# Patient Record
Sex: MALE | Race: BLACK OR AFRICAN AMERICAN | NOT HISPANIC OR LATINO | Employment: FULL TIME | URBAN - METROPOLITAN AREA
[De-identification: names, ages, dates, MRNs, and addresses within clinical notes are randomized per-mention and may not be internally consistent; named-entity substitution may affect disease eponyms.]

---

## 2017-08-30 ENCOUNTER — OFFICE VISIT (OUTPATIENT)
Dept: UROLOGY | Facility: CLINIC | Age: 27
End: 2017-08-30
Payer: COMMERCIAL

## 2017-08-30 VITALS
HEIGHT: 67 IN | WEIGHT: 185.63 LBS | DIASTOLIC BLOOD PRESSURE: 75 MMHG | HEART RATE: 60 BPM | SYSTOLIC BLOOD PRESSURE: 108 MMHG | BODY MASS INDEX: 29.13 KG/M2

## 2017-08-30 DIAGNOSIS — E29.1 TESTICULAR FAILURE: Primary | ICD-10-CM

## 2017-08-30 PROCEDURE — 99999 PR PBB SHADOW E&M-NEW PATIENT-LVL III: CPT | Mod: PBBFAC,,, | Performed by: UROLOGY

## 2017-08-30 PROCEDURE — 99204 OFFICE O/P NEW MOD 45 MIN: CPT | Mod: S$GLB,,, | Performed by: UROLOGY

## 2017-08-30 PROCEDURE — 3008F BODY MASS INDEX DOCD: CPT | Mod: S$GLB,,, | Performed by: UROLOGY

## 2017-08-30 NOTE — LETTER
August 30, 2017        Maxine Greer MD  [address]  [facility] [address]             [facility] - Urology 4th Floor  [address]  [place] [address]  Phone: [phone]   Patient: Elder Ramos   MR Number: [id number]   YOB: 1990   Date of Visit: 8/30/2017       Dear Dr. Greer:    Thank you for referring Elder Ramos to me for evaluation. Attached you will find relevant portions of my assessment and plan of care.    If you have questions, please do not hesitate to call me. I look forward to following Elder Ramos along with you.    Sincerely,      Marc Obrien MD            CC  No Recipients    Enclosure

## 2017-08-30 NOTE — PROGRESS NOTES
"Chief Complaint:  Infertility    HPI:    Mr. Ramos is a 27 y.o.  male who has been with his girlfriend for the past 4 years. They have been trying to achieve a pregnancy for the past 3 years but without success. Elder Ramos has not undergone a semen analysis. He denies a history of erectile dysfunction and ejaculatory problems.    He has achieved 0 pregnancies in the past.    His girlfriend  is 28 years old. Her menses are regular. She has not undergone prior hysterosalpingogram. She has achieved 0 prior pregnancies.  She sees Dr. Greer.    The couple has not undergone prior intrauterine insemination procedures.    The couple has not undergone prior in-vitro fertilization procedures.    Elder Ramos denies a history of exposure to harmful chemicals, toxins, and radiation.    No history of recent fevers greater than 101.5 degrees Farenheit.    No history of recent exposure to "wet heat."    No history of urological trauma or testicular torsion.    No history of prostatitis, epididymitis, and orchitis.    No history of post-pubertal mumps.    There is no known family history of fertility problems.    REVIEW OF SYSTEMS:     He denies any history of headache, blurred vision, fever, nausea, vomiting, chills, flank discomfort, abdominal pain, bleeding per rectum, cough, SOB, recent loss of consciousness, recent mental status changes, seizures, dizziness, or upper/lower extremity weakness.    PHYSICAL EXAM:     The patient generally appears in good health, is appropriately interactive, and is in no apparent distress.     Eyes: anicteric sclerae, moist conjunctivae; no lid-lag; PERRLA     HENT: Atraumatic; oropharynx clear with moist mucous membranes and no mucosal ulcerations;normal hard and soft palate.  No evidence of lymphadenopathy.    Neck: Trachea midline.  No thyromegaly.    Musculoskeletal: No abnormal gait.    Skin: No lesions.    Mental: Cooperative with normal affect.  Is oriented to time, place, " "and person.    Neuro: Grossly intact.    Chest: Normal inspiratory effort.   No accessory muscles.  No audible wheezes.  Respirations symmetric on inspiration and expiration.    Heart: Regular rhythm.      Abdomen:  Soft, non-tender. No masses or organomegaly. Bladder is not palpable. No evidence of flank discomfort. No evidence of inguinal hernia.    Genitourinary: Penis is normal with no evidence of plaques or induration. Urethral meatus is normal. Scrotum is normal. Testes are descended bilaterally with no evidence of abnormal masses or tenderness. Epididymis, vas deferens, and cord structures are normal bilaterally.  Testicular volume is approximately 18 cc bilaterally.    Extremities: No cyanosis, clubbing, or edema.    IMPRESSION & PLAN:    Mr. Ramos is a 27 y.o.  male who has been with his girlfriend for the past 4 years. They have been trying to achieve a pregnancy for the past 3 years but without success. Elder Ramos has not undergone a semen analysis. He denies a history of erectile dysfunction and ejaculatory problems.    He has achieved 0 pregnancies in the past.    1.  FSH, LH, testosterone, prolactin, and estradiol serum levels today.  2.  Semen analysis x 2.  3.  Return to the clinic in 3 weeks to discuss test results and treatment plan.  4.  Recommend avoiding "wet heat."  5.  Recommend taking a multivitamin and 500 mg of vitamin c daily in addition to the multivitamin.  6.  Please send a copy of the note to Dr. Greer.  Thank you for the consultation.    CC: Zoey    "

## 2021-08-27 ENCOUNTER — CLINICAL SUPPORT (OUTPATIENT)
Dept: URGENT CARE | Facility: CLINIC | Age: 31
End: 2021-08-27
Payer: COMMERCIAL

## 2021-08-27 DIAGNOSIS — Z11.9 ENCOUNTER FOR SCREENING EXAMINATION FOR INFECTIOUS DISEASE: Primary | ICD-10-CM

## 2021-08-27 LAB
CTP QC/QA: YES
SARS-COV-2 RDRP RESP QL NAA+PROBE: NEGATIVE

## 2021-08-27 PROCEDURE — 99211 OFF/OP EST MAY X REQ PHY/QHP: CPT | Mod: S$GLB,CS,, | Performed by: FAMILY MEDICINE

## 2021-08-27 PROCEDURE — U0002: ICD-10-PCS | Mod: QW,S$GLB,, | Performed by: FAMILY MEDICINE

## 2021-08-27 PROCEDURE — U0002 COVID-19 LAB TEST NON-CDC: HCPCS | Mod: QW,S$GLB,, | Performed by: FAMILY MEDICINE

## 2021-08-27 PROCEDURE — 99211 PR OFFICE/OUTPT VISIT, EST, LEVL I: ICD-10-PCS | Mod: S$GLB,CS,, | Performed by: FAMILY MEDICINE

## 2024-04-22 ENCOUNTER — TELEPHONE (OUTPATIENT)
Dept: UROLOGY | Facility: CLINIC | Age: 34
End: 2024-04-22

## 2024-04-22 NOTE — TELEPHONE ENCOUNTER
Call was placed to patient no answer left VM. Patient doesn't have portal set up will attempt to call patient tomorrow.

## 2024-05-27 ENCOUNTER — TELEPHONE (OUTPATIENT)
Dept: UROLOGY | Facility: CLINIC | Age: 34
End: 2024-05-27

## 2024-09-06 ENCOUNTER — HOSPITAL ENCOUNTER (INPATIENT)
Facility: OTHER | Age: 34
LOS: 1 days | Discharge: HOME | DRG: 642 | End: 2024-09-07
Attending: EMERGENCY MEDICINE | Admitting: HOSPITALIST
Payer: MEDICAID

## 2024-09-06 DIAGNOSIS — F12.288 CANNABIS HYPEREMESIS SYNDROME CONCURRENT WITH AND DUE TO CANNABIS DEPENDENCE: ICD-10-CM

## 2024-09-06 DIAGNOSIS — R11.2 NAUSEA AND VOMITING, UNSPECIFIED VOMITING TYPE: ICD-10-CM

## 2024-09-06 DIAGNOSIS — R07.9 CHEST PAIN: ICD-10-CM

## 2024-09-06 DIAGNOSIS — R11.2 CANNABINOID HYPEREMESIS SYNDROME: ICD-10-CM

## 2024-09-06 DIAGNOSIS — F12.90 CANNABINOID HYPEREMESIS SYNDROME: ICD-10-CM

## 2024-09-06 DIAGNOSIS — E83.39 HYPOPHOSPHATEMIA: ICD-10-CM

## 2024-09-06 DIAGNOSIS — R11.2 INTRACTABLE NAUSEA AND VOMITING: Primary | ICD-10-CM

## 2024-09-06 DIAGNOSIS — E86.0 DEHYDRATION: ICD-10-CM

## 2024-09-06 LAB
ALBUMIN SERPL BCP-MCNC: 4.2 G/DL (ref 3.5–5.2)
ALP SERPL-CCNC: 46 U/L (ref 55–135)
ALT SERPL W/O P-5'-P-CCNC: 17 U/L (ref 10–44)
ANION GAP SERPL CALC-SCNC: 10 MMOL/L (ref 8–16)
ANION GAP SERPL CALC-SCNC: 14 MMOL/L (ref 8–16)
AST SERPL-CCNC: 22 U/L (ref 10–40)
BASOPHILS # BLD AUTO: 0.01 K/UL (ref 0–0.2)
BASOPHILS NFR BLD: 0.2 % (ref 0–1.9)
BILIRUB SERPL-MCNC: 1.3 MG/DL (ref 0.1–1)
BILIRUB UR QL STRIP: NEGATIVE
BUN SERPL-MCNC: 9 MG/DL (ref 6–20)
BUN SERPL-MCNC: 9 MG/DL (ref 6–20)
CALCIUM SERPL-MCNC: 8.7 MG/DL (ref 8.7–10.5)
CALCIUM SERPL-MCNC: 8.9 MG/DL (ref 8.7–10.5)
CHLORIDE SERPL-SCNC: 106 MMOL/L (ref 95–110)
CHLORIDE SERPL-SCNC: 109 MMOL/L (ref 95–110)
CLARITY UR: CLEAR
CO2 SERPL-SCNC: 18 MMOL/L (ref 23–29)
CO2 SERPL-SCNC: 20 MMOL/L (ref 23–29)
COLOR UR: YELLOW
CREAT SERPL-MCNC: 1 MG/DL (ref 0.5–1.4)
CREAT SERPL-MCNC: 1.1 MG/DL (ref 0.5–1.4)
DIFFERENTIAL METHOD BLD: ABNORMAL
EOSINOPHIL # BLD AUTO: 0 K/UL (ref 0–0.5)
EOSINOPHIL NFR BLD: 0 % (ref 0–8)
ERYTHROCYTE [DISTWIDTH] IN BLOOD BY AUTOMATED COUNT: 11 % (ref 11.5–14.5)
EST. GFR  (NO RACE VARIABLE): >60 ML/MIN/1.73 M^2
EST. GFR  (NO RACE VARIABLE): >60 ML/MIN/1.73 M^2
GLUCOSE SERPL-MCNC: 105 MG/DL (ref 70–110)
GLUCOSE SERPL-MCNC: 99 MG/DL (ref 70–110)
GLUCOSE UR QL STRIP: NEGATIVE
HCT VFR BLD AUTO: 42.2 % (ref 40–54)
HCV AB SERPL QL IA: NEGATIVE
HGB BLD-MCNC: 14.5 G/DL (ref 14–18)
HGB UR QL STRIP: NEGATIVE
HIV 1+2 AB+HIV1 P24 AG SERPL QL IA: NEGATIVE
IMM GRANULOCYTES # BLD AUTO: 0.01 K/UL (ref 0–0.04)
IMM GRANULOCYTES NFR BLD AUTO: 0.2 % (ref 0–0.5)
KETONES UR QL STRIP: ABNORMAL
LEUKOCYTE ESTERASE UR QL STRIP: NEGATIVE
LIPASE SERPL-CCNC: 10 U/L (ref 4–60)
LYMPHOCYTES # BLD AUTO: 1 K/UL (ref 1–4.8)
LYMPHOCYTES NFR BLD: 20.2 % (ref 18–48)
MAGNESIUM SERPL-MCNC: 2.2 MG/DL (ref 1.6–2.6)
MAGNESIUM SERPL-MCNC: 2.6 MG/DL (ref 1.6–2.6)
MCH RBC QN AUTO: 34.3 PG (ref 27–31)
MCHC RBC AUTO-ENTMCNC: 34.4 G/DL (ref 32–36)
MCV RBC AUTO: 100 FL (ref 82–98)
MONOCYTES # BLD AUTO: 0.5 K/UL (ref 0.3–1)
MONOCYTES NFR BLD: 9.8 % (ref 4–15)
NEUTROPHILS # BLD AUTO: 3.6 K/UL (ref 1.8–7.7)
NEUTROPHILS NFR BLD: 69.6 % (ref 38–73)
NITRITE UR QL STRIP: NEGATIVE
NRBC BLD-RTO: 0 /100 WBC
PH UR STRIP: 7 [PH] (ref 5–8)
PHOSPHATE SERPL-MCNC: 3.2 MG/DL (ref 2.7–4.5)
PHOSPHATE SERPL-MCNC: <1 MG/DL (ref 2.7–4.5)
PLATELET # BLD AUTO: 216 K/UL (ref 150–450)
PMV BLD AUTO: 9.1 FL (ref 9.2–12.9)
POCT GLUCOSE: 100 MG/DL (ref 70–110)
POCT GLUCOSE: 101 MG/DL (ref 70–110)
POTASSIUM SERPL-SCNC: 3.5 MMOL/L (ref 3.5–5.1)
POTASSIUM SERPL-SCNC: 4.4 MMOL/L (ref 3.5–5.1)
PROT SERPL-MCNC: 7.7 G/DL (ref 6–8.4)
PROT UR QL STRIP: NEGATIVE
RBC # BLD AUTO: 4.23 M/UL (ref 4.6–6.2)
SODIUM SERPL-SCNC: 138 MMOL/L (ref 136–145)
SODIUM SERPL-SCNC: 139 MMOL/L (ref 136–145)
SP GR UR STRIP: 1.02 (ref 1–1.03)
URN SPEC COLLECT METH UR: ABNORMAL
UROBILINOGEN UR STRIP-ACNC: ABNORMAL EU/DL
WBC # BLD AUTO: 5.1 K/UL (ref 3.9–12.7)

## 2024-09-06 PROCEDURE — 99285 EMERGENCY DEPT VISIT HI MDM: CPT | Mod: 25

## 2024-09-06 PROCEDURE — 63600175 PHARM REV CODE 636 W HCPCS: Performed by: EMERGENCY MEDICINE

## 2024-09-06 PROCEDURE — 83735 ASSAY OF MAGNESIUM: CPT | Performed by: EMERGENCY MEDICINE

## 2024-09-06 PROCEDURE — 86803 HEPATITIS C AB TEST: CPT | Performed by: EMERGENCY MEDICINE

## 2024-09-06 PROCEDURE — 96374 THER/PROPH/DIAG INJ IV PUSH: CPT

## 2024-09-06 PROCEDURE — 80053 COMPREHEN METABOLIC PANEL: CPT | Performed by: EMERGENCY MEDICINE

## 2024-09-06 PROCEDURE — 36415 COLL VENOUS BLD VENIPUNCTURE: CPT | Performed by: HOSPITALIST

## 2024-09-06 PROCEDURE — 25000003 PHARM REV CODE 250: Performed by: EMERGENCY MEDICINE

## 2024-09-06 PROCEDURE — 96361 HYDRATE IV INFUSION ADD-ON: CPT

## 2024-09-06 PROCEDURE — 11000001 HC ACUTE MED/SURG PRIVATE ROOM

## 2024-09-06 PROCEDURE — 63600175 PHARM REV CODE 636 W HCPCS: Performed by: HOSPITALIST

## 2024-09-06 PROCEDURE — 83690 ASSAY OF LIPASE: CPT | Performed by: EMERGENCY MEDICINE

## 2024-09-06 PROCEDURE — 96375 TX/PRO/DX INJ NEW DRUG ADDON: CPT

## 2024-09-06 PROCEDURE — 25000003 PHARM REV CODE 250: Performed by: HOSPITALIST

## 2024-09-06 PROCEDURE — 81003 URINALYSIS AUTO W/O SCOPE: CPT | Performed by: HOSPITALIST

## 2024-09-06 PROCEDURE — 84100 ASSAY OF PHOSPHORUS: CPT | Performed by: EMERGENCY MEDICINE

## 2024-09-06 PROCEDURE — 80048 BASIC METABOLIC PNL TOTAL CA: CPT | Mod: XB | Performed by: HOSPITALIST

## 2024-09-06 PROCEDURE — 85025 COMPLETE CBC W/AUTO DIFF WBC: CPT | Performed by: EMERGENCY MEDICINE

## 2024-09-06 PROCEDURE — 84100 ASSAY OF PHOSPHORUS: CPT | Mod: 91 | Performed by: HOSPITALIST

## 2024-09-06 PROCEDURE — 87389 HIV-1 AG W/HIV-1&-2 AB AG IA: CPT | Performed by: EMERGENCY MEDICINE

## 2024-09-06 PROCEDURE — 96365 THER/PROPH/DIAG IV INF INIT: CPT

## 2024-09-06 PROCEDURE — 83735 ASSAY OF MAGNESIUM: CPT | Mod: 91 | Performed by: HOSPITALIST

## 2024-09-06 RX ORDER — INSULIN ASPART 100 [IU]/ML
0-5 INJECTION, SOLUTION INTRAVENOUS; SUBCUTANEOUS
Status: DISCONTINUED | OUTPATIENT
Start: 2024-09-06 | End: 2024-09-07 | Stop reason: HOSPADM

## 2024-09-06 RX ORDER — ACETAMINOPHEN 325 MG/1
650 TABLET ORAL EVERY 4 HOURS PRN
Status: DISCONTINUED | OUTPATIENT
Start: 2024-09-06 | End: 2024-09-07 | Stop reason: HOSPADM

## 2024-09-06 RX ORDER — IBUPROFEN 200 MG
24 TABLET ORAL
Status: DISCONTINUED | OUTPATIENT
Start: 2024-09-06 | End: 2024-09-07 | Stop reason: HOSPADM

## 2024-09-06 RX ORDER — FAMOTIDINE 10 MG/ML
20 INJECTION INTRAVENOUS
Status: COMPLETED | OUTPATIENT
Start: 2024-09-06 | End: 2024-09-06

## 2024-09-06 RX ORDER — SODIUM CHLORIDE, SODIUM LACTATE, POTASSIUM CHLORIDE, CALCIUM CHLORIDE 600; 310; 30; 20 MG/100ML; MG/100ML; MG/100ML; MG/100ML
INJECTION, SOLUTION INTRAVENOUS CONTINUOUS
Status: DISCONTINUED | OUTPATIENT
Start: 2024-09-06 | End: 2024-09-07 | Stop reason: HOSPADM

## 2024-09-06 RX ORDER — TALC
6 POWDER (GRAM) TOPICAL NIGHTLY PRN
Status: DISCONTINUED | OUTPATIENT
Start: 2024-09-06 | End: 2024-09-07 | Stop reason: HOSPADM

## 2024-09-06 RX ORDER — IBUPROFEN 200 MG
16 TABLET ORAL
Status: DISCONTINUED | OUTPATIENT
Start: 2024-09-06 | End: 2024-09-07 | Stop reason: HOSPADM

## 2024-09-06 RX ORDER — PROCHLORPERAZINE EDISYLATE 5 MG/ML
5 INJECTION INTRAMUSCULAR; INTRAVENOUS EVERY 6 HOURS PRN
Status: DISCONTINUED | OUTPATIENT
Start: 2024-09-06 | End: 2024-09-07 | Stop reason: HOSPADM

## 2024-09-06 RX ORDER — MORPHINE SULFATE 2 MG/ML
2 INJECTION, SOLUTION INTRAMUSCULAR; INTRAVENOUS EVERY 6 HOURS PRN
Status: DISCONTINUED | OUTPATIENT
Start: 2024-09-06 | End: 2024-09-07 | Stop reason: HOSPADM

## 2024-09-06 RX ORDER — DROPERIDOL 2.5 MG/ML
1.25 INJECTION, SOLUTION INTRAMUSCULAR; INTRAVENOUS EVERY 6 HOURS
Status: DISCONTINUED | OUTPATIENT
Start: 2024-09-06 | End: 2024-09-07 | Stop reason: HOSPADM

## 2024-09-06 RX ORDER — GLUCAGON 1 MG
1 KIT INJECTION
Status: DISCONTINUED | OUTPATIENT
Start: 2024-09-06 | End: 2024-09-07 | Stop reason: HOSPADM

## 2024-09-06 RX ORDER — NALOXONE HCL 0.4 MG/ML
0.02 VIAL (ML) INJECTION
Status: DISCONTINUED | OUTPATIENT
Start: 2024-09-06 | End: 2024-09-07 | Stop reason: HOSPADM

## 2024-09-06 RX ORDER — ONDANSETRON HYDROCHLORIDE 2 MG/ML
4 INJECTION, SOLUTION INTRAVENOUS EVERY 8 HOURS PRN
Status: DISCONTINUED | OUTPATIENT
Start: 2024-09-06 | End: 2024-09-07 | Stop reason: HOSPADM

## 2024-09-06 RX ORDER — PANTOPRAZOLE SODIUM 40 MG/10ML
40 INJECTION, POWDER, LYOPHILIZED, FOR SOLUTION INTRAVENOUS 2 TIMES DAILY
Status: DISCONTINUED | OUTPATIENT
Start: 2024-09-06 | End: 2024-09-07 | Stop reason: HOSPADM

## 2024-09-06 RX ORDER — DROPERIDOL 2.5 MG/ML
1.25 INJECTION, SOLUTION INTRAMUSCULAR; INTRAVENOUS
Status: COMPLETED | OUTPATIENT
Start: 2024-09-06 | End: 2024-09-06

## 2024-09-06 RX ADMIN — PANTOPRAZOLE SODIUM 40 MG: 40 INJECTION, POWDER, LYOPHILIZED, FOR SOLUTION INTRAVENOUS at 08:09

## 2024-09-06 RX ADMIN — POTASSIUM PHOSPHATE, MONOBASIC POTASSIUM PHOSPHATE, DIBASIC 30 MMOL: 224; 236 INJECTION, SOLUTION, CONCENTRATE INTRAVENOUS at 02:09

## 2024-09-06 RX ADMIN — DROPERIDOL 1.25 MG: 2.5 INJECTION, SOLUTION INTRAMUSCULAR; INTRAVENOUS at 06:09

## 2024-09-06 RX ADMIN — SODIUM CHLORIDE 1000 ML: 0.9 INJECTION, SOLUTION INTRAVENOUS at 12:09

## 2024-09-06 RX ADMIN — PROCHLORPERAZINE EDISYLATE 5 MG: 5 INJECTION INTRAMUSCULAR; INTRAVENOUS at 02:09

## 2024-09-06 RX ADMIN — DROPERIDOL 1.25 MG: 2.5 INJECTION, SOLUTION INTRAMUSCULAR; INTRAVENOUS at 12:09

## 2024-09-06 RX ADMIN — SODIUM CHLORIDE, POTASSIUM CHLORIDE, SODIUM LACTATE AND CALCIUM CHLORIDE: 600; 310; 30; 20 INJECTION, SOLUTION INTRAVENOUS at 11:09

## 2024-09-06 RX ADMIN — PANTOPRAZOLE SODIUM 40 MG: 40 INJECTION, POWDER, LYOPHILIZED, FOR SOLUTION INTRAVENOUS at 04:09

## 2024-09-06 RX ADMIN — MORPHINE SULFATE 2 MG: 2 INJECTION, SOLUTION INTRAMUSCULAR; INTRAVENOUS at 04:09

## 2024-09-06 RX ADMIN — ONDANSETRON 4 MG: 2 INJECTION INTRAMUSCULAR; INTRAVENOUS at 05:09

## 2024-09-06 RX ADMIN — DROPERIDOL 1.25 MG: 2.5 INJECTION, SOLUTION INTRAMUSCULAR; INTRAVENOUS at 11:09

## 2024-09-06 RX ADMIN — SODIUM CHLORIDE, POTASSIUM CHLORIDE, SODIUM LACTATE AND CALCIUM CHLORIDE: 600; 310; 30; 20 INJECTION, SOLUTION INTRAVENOUS at 03:09

## 2024-09-06 RX ADMIN — FAMOTIDINE 20 MG: 10 INJECTION, SOLUTION INTRAVENOUS at 12:09

## 2024-09-06 NOTE — ED TRIAGE NOTES
Pt reports N/V/ for the past several days. He was recently seen for the same thing. Pt states he last smoked marijuana last week.

## 2024-09-06 NOTE — H&P
[facility] Medicine  History & Physical    Patient Name: Elder Ramos  MRN: [id number]  Patient Class: IP- Inpatient  Admission Date: 9/6/2024  Attending Physician: Nazia Pelletier MD   Primary Care Provider: Jessica Primary Doctor         Patient information was obtained from patient and ER records.     Subjective:     Principal Problem:Intractable nausea and vomiting    Chief Complaint:   Chief Complaint   Patient presents with    Emesis     Reports N/V and upper abd pain onset Friday. Seen at [facility] for same complaint. Hx of cannabinoid hyperemesis. Vomiting on arrival to ED.         HPI: 35 y/o male with no significant past medical history presented with intractable nausea and vomiting x1 week.  Patient does smoke marijuana daily for many years but has not smoked since starting with the nausea and vomiting.  He has been to 2 separate ER visits, 1st on 09/02 and then again yesterday prior to presenting to this facility where he was diagnosed with cannabis hyperemesis syndrome and instructed to follow up with GI on the last ER visit.  He reports he saw Dr. Sanchez in clinic, and was prescribed a medication for nausea and something for her stomach this morning.  Patient reports he feels lightheaded, weak, and constant nausea.  He did reports some relief after getting fluid bolus, but now does not feel well again.  He was abdominal pain/discomfort in the midepigastric area, with no radiation of the pain.  No diarrhea.  No fever or chills. Last BM about 6 days ago. In the ER, workup remarkable for <1 phosphorous. Lipase normal, and transaminases normal.    History reviewed. No pertinent past medical history.    History reviewed. No pertinent surgical history.    Review of patient's allergies indicates:  No Known Allergies    No current facility-administered medications on file prior to encounter.     No current outpatient medications on file prior to encounter.     Family History       Problem  Relation (Age of Onset)    Diabetes Mother          Tobacco Use    Smoking status: Not on file    Smokeless tobacco: Not on file   Substance and Sexual Activity    Alcohol use: Yes     Comment: Social    Drug use: Yes     Types: Marijuana     Comment: Smokes daily for many years    Sexual activity: Yes     Review of Systems   Constitutional:  Positive for activity change, appetite change and fatigue. Negative for chills and fever.   HENT:  Negative for trouble swallowing.    Eyes:  Negative for visual disturbance.   Respiratory:  Negative for shortness of breath.    Cardiovascular:  Negative for chest pain and leg swelling.   Gastrointestinal:  Positive for abdominal pain, nausea and vomiting. Negative for abdominal distention, blood in stool and diarrhea.   Endocrine: Negative for polyphagia.   Genitourinary:  Negative for hematuria.   Musculoskeletal:  Negative for back pain.   Skin:  Negative for rash.   Neurological:  Positive for weakness and light-headedness.   Hematological:  Does not bruise/bleed easily.   Psychiatric/Behavioral:  Negative for confusion.      Objective:     Vital Signs (Most Recent):  Temp: 98.1 °F (36.7 °C) (09/06/24 1044)  Pulse: 73 (09/06/24 1402)  Resp: 20 (09/06/24 1044)  BP: (!) 144/85 (09/06/24 1402)  SpO2: 99 % (09/06/24 1402) Vital Signs (24h Range):  Temp:  [98.1 °F (36.7 °C)] 98.1 °F (36.7 °C)  Pulse:  [65-80] 73  Resp:  [20] 20  SpO2:  [99 %-100 %] 99 %  BP: (137-154)/(74-85) 144/85     Weight: 81.6 kg (180 lb)  Body mass index is 27.37 kg/m².     Physical Exam  Vitals reviewed.   Constitutional:       General: He is not in acute distress.     Appearance: He is ill-appearing.   HENT:      Head: Normocephalic and atraumatic.      Nose: Nose normal.      Mouth/Throat:      Mouth: Mucous membranes are dry.   Eyes:      Extraocular Movements: Extraocular movements intact.      Conjunctiva/sclera: Conjunctivae normal.   Cardiovascular:      Rate and Rhythm: Normal rate and regular  rhythm.   Pulmonary:      Effort: Pulmonary effort is normal.      Breath sounds: Normal breath sounds.   Abdominal:      General: Bowel sounds are normal. There is no distension.      Palpations: Abdomen is soft.      Tenderness: There is abdominal tenderness. There is no guarding or rebound.   Musculoskeletal:         General: Normal range of motion.      Cervical back: Normal range of motion and neck supple.      Right lower leg: No edema.      Left lower leg: No edema.   Skin:     General: Skin is warm and dry.      Capillary Refill: Capillary refill takes less than 2 seconds.   Neurological:      Mental Status: He is alert and oriented to person, place, and time.   Psychiatric:         Mood and Affect: Mood normal.         Behavior: Behavior normal.         Thought Content: Thought content normal.                Significant Labs: All pertinent labs within the past 24 hours have been reviewed.    Significant Imaging: I have reviewed all pertinent imaging results/findings within the past 24 hours.  Assessment/Plan:     * Intractable nausea and vomiting  Hypophosphatemia  Dehydration  Possible cannabis hyperemesis syndrome with current cannabis dependence  - Patient with long history of mariajuana use and no previous episodes in the past of cannabis hyperemesis syndrome. Definitive volume depletion/dehydration that is likely contributing to his symptoms. No other obvious other sources at this time. Will do further workup with UA with reflex to culture and check KUB given no recent imaging and no reported bowel movement in 5-6 days  - Correct volume status with IV fluids with LR  - Supplement with IV phosphorous and repeat laboratory testing   - Start IV PPI Q12   - Will have schedule droperidol IV Q6 and have anti-emetics PRN   - Pain control and supportive care  - Trend with labs and correct electrolytes as needed      VTE Risk Mitigation (From admission, onward)           Ordered     IP VTE LOW RISK PATIENT   Once         09/06/24 1357     Place sequential compression device  Until discontinued         09/06/24 1357                         Nazia Pelletier MD  Department of Hospital Medicine  [facility] ([place])

## 2024-09-06 NOTE — Clinical Note
Diagnosis: Hypophosphatemia [043127]   Reason for IP Medical Treatment  (Clinical interventions that can only be accomplished in the IP setting? ) :: IV med   Plans for Post-Acute care--if anticipated (pick the single best option):: A. No post acute care anticipated at this time

## 2024-09-06 NOTE — SUBJECTIVE & OBJECTIVE
History reviewed. No pertinent past medical history.    History reviewed. No pertinent surgical history.    Review of patient's allergies indicates:  No Known Allergies    No current facility-administered medications on file prior to encounter.     No current outpatient medications on file prior to encounter.     Family History       Problem Relation (Age of Onset)    Diabetes Mother          Tobacco Use    Smoking status: Not on file    Smokeless tobacco: Not on file   Substance and Sexual Activity    Alcohol use: Yes     Comment: Social    Drug use: Yes     Types: Marijuana     Comment: Smokes daily for many years    Sexual activity: Yes     Review of Systems   Constitutional:  Positive for activity change, appetite change and fatigue. Negative for chills and fever.   HENT:  Negative for trouble swallowing.    Eyes:  Negative for visual disturbance.   Respiratory:  Negative for shortness of breath.    Cardiovascular:  Negative for chest pain and leg swelling.   Gastrointestinal:  Positive for abdominal pain, nausea and vomiting. Negative for abdominal distention, blood in stool and diarrhea.   Endocrine: Negative for polyphagia.   Genitourinary:  Negative for hematuria.   Musculoskeletal:  Negative for back pain.   Skin:  Negative for rash.   Neurological:  Positive for weakness and light-headedness.   Hematological:  Does not bruise/bleed easily.   Psychiatric/Behavioral:  Negative for confusion.      Objective:     Vital Signs (Most Recent):  Temp: 98.1 °F (36.7 °C) (09/06/24 1044)  Pulse: 73 (09/06/24 1402)  Resp: 20 (09/06/24 1044)  BP: (!) 144/85 (09/06/24 1402)  SpO2: 99 % (09/06/24 1402) Vital Signs (24h Range):  Temp:  [98.1 °F (36.7 °C)] 98.1 °F (36.7 °C)  Pulse:  [65-80] 73  Resp:  [20] 20  SpO2:  [99 %-100 %] 99 %  BP: (137-154)/(74-85) 144/85     Weight: 81.6 kg (180 lb)  Body mass index is 27.37 kg/m².     Physical Exam  Vitals reviewed.   Constitutional:       General: He is not in acute distress.      Appearance: He is ill-appearing.   HENT:      Head: Normocephalic and atraumatic.      Nose: Nose normal.      Mouth/Throat:      Mouth: Mucous membranes are dry.   Eyes:      Extraocular Movements: Extraocular movements intact.      Conjunctiva/sclera: Conjunctivae normal.   Cardiovascular:      Rate and Rhythm: Normal rate and regular rhythm.   Pulmonary:      Effort: Pulmonary effort is normal.      Breath sounds: Normal breath sounds.   Abdominal:      General: Bowel sounds are normal. There is no distension.      Palpations: Abdomen is soft.      Tenderness: There is abdominal tenderness. There is no guarding or rebound.   Musculoskeletal:         General: Normal range of motion.      Cervical back: Normal range of motion and neck supple.      Right lower leg: No edema.      Left lower leg: No edema.   Skin:     General: Skin is warm and dry.      Capillary Refill: Capillary refill takes less than 2 seconds.   Neurological:      Mental Status: He is alert and oriented to person, place, and time.   Psychiatric:         Mood and Affect: Mood normal.         Behavior: Behavior normal.         Thought Content: Thought content normal.                Significant Labs: All pertinent labs within the past 24 hours have been reviewed.    Significant Imaging: I have reviewed all pertinent imaging results/findings within the past 24 hours.

## 2024-09-06 NOTE — NURSING
Pt received form ED, to the floor via transport,no report received on arrival.Pt appeared to be distress free and denies any pain and nausea at the moment.Vitals and BG monitored.IV fluids administered as per order.Admission assessment completed.no any skin skin issues noted.Pt is ambulatory and has no complaints at the moment. Nurses Note -- 4 Eyes      9/6/2024   4:14 PM      Skin assessed during: Admit      [x] No Altered Skin Integrity Present    []Prevention Measures Documented      [] Yes- Altered Skin Integrity Present or Discovered   [] LDA Added if Not in Epic (Describe Wound)   [] New Altered Skin Integrity was Present on Admit and Documented in LDA   [] Wound Image Taken    Wound Care Consulted? No    Attending Nurse:  MALIKA Longoria    Second RN/Staff Member:  MALIKA King

## 2024-09-06 NOTE — ED PROVIDER NOTES
Chief complaint:  Emesis (Reports N/V and upper abd pain onset Friday. Seen at [facility] for same complaint. Hx of cannabinoid hyperemesis. Vomiting on arrival to ED. )      Source of information:  Patient, spouse, old chart    HPI:  Elder Ramos is a 34 y.o. male presenting with recurrence of nausea vomiting, upper abdominal cramping.  Complains of feeling fatigued.  Symptoms have been present for the past 4 or 5 days.  This is his 3rd emergency room visit for this.  Patient reports that he was not prescribed any nausea medicine at the prior visits.  He did see Dr. Teague at [facility] earlier today, I do not have access to those notes but the patient's wife states he instructed to them noted, over to the emergency department and he is also written them a prescription for Zofran and what sounds like omeprazole or similar PPI.  The patient has not had any prior abdominal surgery.  He does not smoke tobacco.  He does drink and was a daily smoker marijuana although stopped this 1 week ago.  It has been discussed with him at previous visits that there is concern this is cannabinoid hyperemesis syndrome.  He does report hot showers help.  He was prescribed capsaicin patches but did not start these yet.  No fevers.  No diarrhea, blood or melena per rectum.  No other acute complaints    ROS: As per HPI    Review of patient's allergies indicates:  No Known Allergies    No current facility-administered medications on file prior to encounter.     No current outpatient medications on file prior to encounter.       PMH:  As per HPI and below:  History reviewed. No pertinent past medical history.  History reviewed. No pertinent surgical history.      Physical Exam:    Vitals:    09/06/24 1221   BP: (!) 154/84   Pulse: 65   Resp:    Temp:        General:  Uncomfortable appearing, mild distress Well developed. Well nourished.  Eyes: PERRL. EOM intact. no photophobia, no nystagmus  Conjunctivae - no pallor or icterus.   ENT:  HEAD: Normal - atraumatic. Normal external ears. Normal nose.  No facial asymmetry. Mucous membranes - dry  Neck: Neck supple. no meningismus.   No JVD.  Cardiovascular: Regular rate and rhythm. Normal S1 and S2. No murmur. No gallop. No rub.  2+ peripheral pulses  Respiratory: Normal breath sounds. No rales. No rhonchi. No wheezes. No tachypnea with exertion.  GI: Soft.  Epigastric tenderness Nondistended. No guarding. No rebound. Normal BS.  Musculoskeletal:  Normal weight-bearing and gait No deformities. Normal ROM x4.   Integument: No acute skin rashes. No clubbing or cyanosis  Neurologic: No gross neurological deficits.    Psychiatric: Awake, alert.  Oriented x3.  Normal speech and mentation.        Labs Reviewed   CBC W/ AUTO DIFFERENTIAL - Abnormal       Result Value    WBC 5.10      RBC 4.23 (*)     Hemoglobin 14.5      Hematocrit 42.2       (*)     MCH 34.3 (*)     MCHC 34.4      RDW 11.0 (*)     Platelets 216      MPV 9.1 (*)     Immature Granulocytes 0.2      Gran # (ANC) 3.6      Immature Grans (Abs) 0.01      Lymph # 1.0      Mono # 0.5      Eos # 0.0      Baso # 0.01      nRBC 0      Gran % 69.6      Lymph % 20.2      Mono % 9.8      Eosinophil % 0.0      Basophil % 0.2      Differential Method Automated     COMPREHENSIVE METABOLIC PANEL - Abnormal    Sodium 138      Potassium 3.5      Chloride 106      CO2 18 (*)     Glucose 99      BUN 9      Creatinine 1.1      Calcium 8.9      Total Protein 7.7      Albumin 4.2      Total Bilirubin 1.3 (*)     Alkaline Phosphatase 46 (*)     AST 22      ALT 17      eGFR >60      Anion Gap 14     PHOSPHORUS - Abnormal    Phosphorus <1.0 (*)     Narrative:     PHOS critical result(s) called and verbal readback obtained from                   Raudel Melendez RN. by MCELIZABETH 09/06/2024 13:09   HIV 1 / 2 ANTIBODY    HIV 1/2 Ag/Ab Negative      Narrative:     Release to patient->Immediate   HEPATITIS C ANTIBODY    Hepatitis C Ab Negative      Narrative:     Release to  patient->Immediate   LIPASE    Lipase 10     MAGNESIUM    Magnesium 2.2         Medications   potassium phosphate 30 mmol in D5W 500 mL infusion (has no administration in time range)   sodium chloride 0.9% bolus 1,000 mL 1,000 mL (1,000 mLs Intravenous New Bag 9/6/24 1220)   droPERidol injection 1.25 mg (1.25 mg Intravenous Given 9/6/24 1220)   famotidine (PF) injection 20 mg (20 mg Intravenous Given 9/6/24 1220)       Medical Decision Making  Differential diagnosis includes gastroenteritis, cannabinoid hyperemesis syndrome, pancreatitis, cholecystitis, hepatitis    Amount and/or Complexity of Data Reviewed  Independent Historian: spouse  External Data Reviewed: labs and notes.  Labs: ordered. Decision-making details documented in ED Course.    Risk  Prescription drug management.  Decision regarding hospitalization.  Diagnosis or treatment significantly limited by social determinants of health.        Critical Care    Date/Time: 9/6/2024 1:30 PM    Performed by: Vinay Royal II, MD  Authorized by: Nazia Pelletier MD  Direct patient critical care time: 20 minutes  Additional history critical care time: 5 minutes  Ordering / reviewing critical care time: 10 minutes  Documentation critical care time: 10 minutes  Consulting other physicians critical care time: 5 minutes  Total critical care time (exclusive of procedural time) : 50 minutes  Critical care was necessary to treat or prevent imminent or life-threatening deterioration of the following conditions: dehydration and metabolic crisis.            MDM:    34 y.o. male with ongoing nausea vomiting which is likely related to marijuana use.  Strongly advised continued cessation.  He did appear dehydrated, IV fluid repletion and Inapsine begun.  His laboratory studies show no leukocytosis.  Normal sodium potassium chloride, however phosphorus was extremely low, less than 1.  After the above interventions he is feeling better, he is now smiling comfortable.  He has  had no further emesis, however given the degree of hypophosphatemia he will need admission for gradual IV repletion, and repeat chemistries to ensure improvement.  Case discussed with the hospitalist service, will admit to Dr Pelletier.  Patient, wife at bedside updated with findings and plan of    Medications   potassium phosphate 30 mmol in D5W 500 mL infusion (has no administration in time range)   sodium chloride 0.9% bolus 1,000 mL 1,000 mL (1,000 mLs Intravenous New Bag 9/6/24 1220)   droPERidol injection 1.25 mg (1.25 mg Intravenous Given 9/6/24 1220)   famotidine (PF) injection 20 mg (20 mg Intravenous Given 9/6/24 1220)       ASSESSMENT:   1. Hypophosphatemia    2. Nausea and vomiting, unspecified vomiting type    3. Dehydration    4. Cannabinoid hyperemesis syndrome             Vinay Royal II, MD  09/06/24 1386

## 2024-09-06 NOTE — HPI
35 y/o male with no significant past medical history presented with intractable nausea and vomiting x1 week.  Patient does smoke marijuana daily for many years but has not smoked since starting with the nausea and vomiting.  He has been to 2 separate ER visits, 1st on 09/02 and then again yesterday prior to presenting to this facility where he was diagnosed with cannabis hyperemesis syndrome and instructed to follow up with GI on the last ER visit.  He reports he saw Dr. Sanchez in clinic, and was prescribed a medication for nausea and something for her stomach this morning.  Patient reports he feels lightheaded, weak, and constant nausea.  He did reports some relief after getting fluid bolus, but now does not feel well again.  He was abdominal pain/discomfort in the midepigastric area, with no radiation of the pain.  No diarrhea.  No fever or chills. Last BM about 6 days ago. In the ER, workup remarkable for <1 phosphorous. Lipase normal, and transaminases normal.

## 2024-09-06 NOTE — ASSESSMENT & PLAN NOTE
Hypophosphatemia  Dehydration  Possible cannabis hyperemesis syndrome with current cannabis dependence  - Patient with long history of mariajuana use and no previous episodes in the past of cannabis hyperemesis syndrome. Definitive volume depletion/dehydration that is likely contributing to his symptoms. No other obvious other sources at this time. Will do further workup with UA with reflex to culture and check KUB given no recent imaging and no reported bowel movement in 5-6 days  - Correct volume status with IV fluids with LR  - Supplement with IV phosphorous and repeat laboratory testing   - Start IV PPI Q12   - Will have schedule droperidol IV Q6 and have anti-emetics PRN   - Pain control and supportive care  - Trend with labs and correct electrolytes as needed

## 2024-09-07 VITALS
DIASTOLIC BLOOD PRESSURE: 80 MMHG | RESPIRATION RATE: 16 BRPM | HEART RATE: 62 BPM | OXYGEN SATURATION: 98 % | WEIGHT: 180 LBS | SYSTOLIC BLOOD PRESSURE: 138 MMHG | BODY MASS INDEX: 27.28 KG/M2 | TEMPERATURE: 98 F | HEIGHT: 68 IN

## 2024-09-07 LAB
ALBUMIN SERPL BCP-MCNC: 3.7 G/DL (ref 3.5–5.2)
ALP SERPL-CCNC: 44 U/L (ref 55–135)
ALT SERPL W/O P-5'-P-CCNC: 15 U/L (ref 10–44)
ANION GAP SERPL CALC-SCNC: 8 MMOL/L (ref 8–16)
AST SERPL-CCNC: 17 U/L (ref 10–40)
BASOPHILS # BLD AUTO: 0.02 K/UL (ref 0–0.2)
BASOPHILS NFR BLD: 0.4 % (ref 0–1.9)
BILIRUB SERPL-MCNC: 1.1 MG/DL (ref 0.1–1)
BUN SERPL-MCNC: 8 MG/DL (ref 6–20)
CALCIUM SERPL-MCNC: 8.8 MG/DL (ref 8.7–10.5)
CHLORIDE SERPL-SCNC: 108 MMOL/L (ref 95–110)
CO2 SERPL-SCNC: 23 MMOL/L (ref 23–29)
CREAT SERPL-MCNC: 1 MG/DL (ref 0.5–1.4)
DIFFERENTIAL METHOD BLD: ABNORMAL
EOSINOPHIL # BLD AUTO: 0 K/UL (ref 0–0.5)
EOSINOPHIL NFR BLD: 0.2 % (ref 0–8)
ERYTHROCYTE [DISTWIDTH] IN BLOOD BY AUTOMATED COUNT: 11 % (ref 11.5–14.5)
EST. GFR  (NO RACE VARIABLE): >60 ML/MIN/1.73 M^2
GLUCOSE SERPL-MCNC: 92 MG/DL (ref 70–110)
HCT VFR BLD AUTO: 38.5 % (ref 40–54)
HGB BLD-MCNC: 12.9 G/DL (ref 14–18)
IMM GRANULOCYTES # BLD AUTO: 0.01 K/UL (ref 0–0.04)
IMM GRANULOCYTES NFR BLD AUTO: 0.2 % (ref 0–0.5)
LYMPHOCYTES # BLD AUTO: 1.4 K/UL (ref 1–4.8)
LYMPHOCYTES NFR BLD: 28 % (ref 18–48)
MAGNESIUM SERPL-MCNC: 2 MG/DL (ref 1.6–2.6)
MCH RBC QN AUTO: 34.5 PG (ref 27–31)
MCHC RBC AUTO-ENTMCNC: 33.5 G/DL (ref 32–36)
MCV RBC AUTO: 103 FL (ref 82–98)
MONOCYTES # BLD AUTO: 0.5 K/UL (ref 0.3–1)
MONOCYTES NFR BLD: 10 % (ref 4–15)
NEUTROPHILS # BLD AUTO: 3.1 K/UL (ref 1.8–7.7)
NEUTROPHILS NFR BLD: 61.2 % (ref 38–73)
NRBC BLD-RTO: 0 /100 WBC
PHOSPHATE SERPL-MCNC: 2.4 MG/DL (ref 2.7–4.5)
PLATELET # BLD AUTO: 184 K/UL (ref 150–450)
PMV BLD AUTO: 9.5 FL (ref 9.2–12.9)
POCT GLUCOSE: 92 MG/DL (ref 70–110)
POCT GLUCOSE: 97 MG/DL (ref 70–110)
POTASSIUM SERPL-SCNC: 3.7 MMOL/L (ref 3.5–5.1)
PROT SERPL-MCNC: 6.5 G/DL (ref 6–8.4)
RBC # BLD AUTO: 3.74 M/UL (ref 4.6–6.2)
SODIUM SERPL-SCNC: 139 MMOL/L (ref 136–145)
WBC # BLD AUTO: 5.08 K/UL (ref 3.9–12.7)

## 2024-09-07 PROCEDURE — 63600175 PHARM REV CODE 636 W HCPCS: Performed by: HOSPITALIST

## 2024-09-07 PROCEDURE — 80053 COMPREHEN METABOLIC PANEL: CPT | Performed by: HOSPITALIST

## 2024-09-07 PROCEDURE — 25000003 PHARM REV CODE 250: Performed by: HOSPITALIST

## 2024-09-07 PROCEDURE — 85025 COMPLETE CBC W/AUTO DIFF WBC: CPT | Performed by: HOSPITALIST

## 2024-09-07 PROCEDURE — 25500020 PHARM REV CODE 255: Performed by: HOSPITALIST

## 2024-09-07 PROCEDURE — 84100 ASSAY OF PHOSPHORUS: CPT | Performed by: HOSPITALIST

## 2024-09-07 PROCEDURE — 36415 COLL VENOUS BLD VENIPUNCTURE: CPT | Performed by: HOSPITALIST

## 2024-09-07 PROCEDURE — 83735 ASSAY OF MAGNESIUM: CPT | Performed by: HOSPITALIST

## 2024-09-07 RX ORDER — PANTOPRAZOLE SODIUM 40 MG/1
40 TABLET, DELAYED RELEASE ORAL DAILY
Status: ON HOLD | COMMUNITY
End: 2024-09-11

## 2024-09-07 RX ORDER — DICYCLOMINE HYDROCHLORIDE 10 MG/1
10 CAPSULE ORAL
Status: ON HOLD | COMMUNITY
End: 2024-09-14

## 2024-09-07 RX ORDER — ONDANSETRON 4 MG/1
8 TABLET, FILM COATED ORAL EVERY 4 HOURS PRN
Status: ON HOLD | COMMUNITY
End: 2024-09-11 | Stop reason: HOSPADM

## 2024-09-07 RX ADMIN — DROPERIDOL 1.25 MG: 2.5 INJECTION, SOLUTION INTRAMUSCULAR; INTRAVENOUS at 05:09

## 2024-09-07 RX ADMIN — MORPHINE SULFATE 2 MG: 2 INJECTION, SOLUTION INTRAMUSCULAR; INTRAVENOUS at 02:09

## 2024-09-07 RX ADMIN — PANTOPRAZOLE SODIUM 40 MG: 40 INJECTION, POWDER, LYOPHILIZED, FOR SOLUTION INTRAVENOUS at 09:09

## 2024-09-07 RX ADMIN — IOHEXOL 100 ML: 350 INJECTION, SOLUTION INTRAVENOUS at 09:09

## 2024-09-07 RX ADMIN — SODIUM CHLORIDE, POTASSIUM CHLORIDE, SODIUM LACTATE AND CALCIUM CHLORIDE: 600; 310; 30; 20 INJECTION, SOLUTION INTRAVENOUS at 05:09

## 2024-09-07 RX ADMIN — ONDANSETRON 4 MG: 2 INJECTION INTRAMUSCULAR; INTRAVENOUS at 02:09

## 2024-09-07 RX ADMIN — SODIUM CHLORIDE, POTASSIUM CHLORIDE, SODIUM LACTATE AND CALCIUM CHLORIDE: 600; 310; 30; 20 INJECTION, SOLUTION INTRAVENOUS at 02:09

## 2024-09-07 RX ADMIN — DROPERIDOL 1.25 MG: 2.5 INJECTION, SOLUTION INTRAMUSCULAR; INTRAVENOUS at 01:09

## 2024-09-07 RX ADMIN — POTASSIUM PHOSPHATE, MONOBASIC POTASSIUM PHOSPHATE, DIBASIC 15 MMOL: 224; 236 INJECTION, SOLUTION, CONCENTRATE INTRAVENOUS at 09:09

## 2024-09-07 NOTE — HOSPITAL COURSE
"Mr. Ramos presented with intractable N/V. Admitted with severe hypophosphatemia and dehydration due to GI loss. Workup with KUB with questionable focal dilated loop of small bowel. Further evaluated with CT of the abd/pelvis with IV contrast showed "no acute abdominopelvic abnormality." He was treated with IV fluids, electrolytes corrected and droperidol IV along with anti-emetics PRN. Clinically improved, volume status corrected, and diet was advanced without difficulty without further emesis. Patient was ruled out for other organic pathology which was negative, and therefore, he likely had cannabis hyperemesis syndrome as likely cause for his intractable nausea and vomiting. He was stable for discharge home, and he was educated to avoid further marijuana use and to continue taking his prescribed PPI, Bentyl and Zofran as needed by his GI physician.  Patient to follow up with Dr. Sanchez as previously arranged.     Intractable nausea and vomiting  Hypophosphatemia  Dehydration  Cannabis hyperemesis syndrome with current cannabis dependence  - Patient with long history of mariajuana use and no previous episodes in the past of cannabis hyperemesis syndrome. Volume depletion/dehydration that is likely contributing to his symptoms. No other obvious other sources at this time.   - Further workup with UA unremarkable. KUB with questionable focal dilated loop of small bowel. Further evaluated with CT of the abd/pelvis with IV contrast showed "no acute abdominopelvic abnormality."   - Corrected volume status with IV fluids with LR  - Supplemented with IV phosphorous, repeated laboratory testing and it was corrected to normal  - Treated with IV PPI Q12 and had scheduled droperidol IV Q6 and have anti-emetics PRN   - Symptomatically improved and patient's diet was advanced to regular without difficulty  - Educated that patient has cannabis hyperemesis syndrome as likely cause for his intractable nausea and vomiting.   - " Encouraged to avoid further marijuana use and to continue taking his prescribed PPI, Bentyl and Zofran as needed by his GI physician and to follow up with Dr. Sanchez as previously arranged.

## 2024-09-07 NOTE — PLAN OF CARE
MSW met with the patient at the bedside. The patient's bertrand Farah is also at the bedside.     Patient is alert and oriented with no communication barriers.     Patient denies having DME at home.       Patient has no PCP. Patient choice pharmacy is bedside delivery.     Patients' family will transport the patient home at discharge.     CM team will continue to follow.       [facility] ([facility])  Initial Discharge Assessment       Primary Care Provider: No, Primary Doctor    Admission Diagnosis: Dehydration [E86.0]  Hypophosphatemia [E83.39]  Chest pain [R07.9]  Cannabinoid hyperemesis syndrome [R11.2, F12.90]  Nausea and vomiting, unspecified vomiting type [R11.2]    Admission Date: 9/6/2024  Expected Discharge Date:     Transition of Care Barriers: (P) None    Payor: /     Extended Emergency Contact Information  Primary Emergency Contact: Sofi Murphy   [place]  Mobile Phone: [phone]  Relation: Significant other    Discharge Plan A: (P) Home with family  Discharge Plan B: (P) Home with family      [id number] - [place], [place] - [address] AT [address] [place] [address]  Phone: [phone] Fax: [phone]      Initial Assessment (most recent)       Adult Discharge Assessment - 09/07/24 0703          Discharge Assessment    Assessment Type Discharge Planning Assessment (P)      Confirmed/corrected address, phone number and insurance Yes (P)      Confirmed Demographics Correct on Facesheet (P)      Source of Information patient;health record (P)      People in Home spouse (P)      Do you expect to return to your current living situation? Yes (P)      Do you have help at home or someone to help you manage your care at home? Yes (P)      Who are your caregiver(s) and their phone number(s)? Family (P)      Prior to hospitilization cognitive status: Alert/Oriented (P)      Current cognitive status:  Alert/Oriented (P)      Walking or Climbing Stairs Difficulty no (P)      Dressing/Bathing Difficulty no (P)      Equipment Currently Used at Home none (P)      Readmission within 30 days? Yes (P)    [facility] Thurday 9-5-24    Patient currently being followed by outpatient case management? No (P)      Do you currently have service(s) that help you manage your care at home? No (P)      Do you take prescription medications? No (P)      Do you have prescription coverage? No (P)      Do you have any problems affording any of your prescribed medications? No (P)      How do you get to doctors appointments? car, drives self;family or friend will provide (P)      Are you on dialysis? No (P)      Do you take coumadin? No (P)      Discharge Plan A Home with family (P)      Discharge Plan B Home with family (P)      DME Needed Upon Discharge  none (P)      Discharge Plan discussed with: Patient (P)      Transition of Care Barriers None (P)         Physical Activity    On average, how many days per week do you engage in moderate to strenuous exercise (like a brisk walk)? 4 days (P)      On average, how many minutes do you engage in exercise at this level? 40 min (P)         Financial Resource Strain    How hard is it for you to pay for the very basics like food, housing, medical care, and heating? Not hard at all (P)         Housing Stability    In the last 12 months, was there a time when you were not able to pay the mortgage or rent on time? No (P)      At any time in the past 12 months, were you homeless or living in a shelter (including now)? No (P)         Transportation Needs    Has the lack of transportation kept you from medical appointments, meetings, work or from getting things needed for daily living? No (P)         Food Insecurity    Within the past 12 months, you worried that your food would run out before you got the money to buy more. Never true (P)      Within the past 12 months, the food you  bought just didn't last and you didn't have money to get more. Never true (P)         Stress    Do you feel stress - tense, restless, nervous, or anxious, or unable to sleep at night because your mind is troubled all the time - these days? Only a little (P)         Social Isolation    How often do you feel lonely or isolated from those around you?  Never (P)         Alcohol Use    Q1: How often do you have a drink containing alcohol? 2-3 times a week (P)      Q2: How many drinks containing alcohol do you have on a typical day when you are drinking? 1 or 2 (P)      Q3: How often do you have six or more drinks on one occasion? Less than monthly (P)         Utilities    In the past 12 months has the electric, gas, oil, or water company threatened to shut off services in your home? No (P)         Health Literacy    How often do you need to have someone help you when you read instructions, pamphlets, or other written material from your doctor or pharmacy? Never (P)

## 2024-09-07 NOTE — DISCHARGE SUMMARY
"[facility] Medicine  Discharge Summary      Patient Name: Elder Ramos  MRN: [id number]  LISA: [id number]  Patient Class: IP- Inpatient  Admission Date: 9/6/2024  Hospital Length of Stay: 1 days  Discharge Date and Time:  09/07/2024 5:38 PM  Attending Physician: Nazia Pelletier MD   Discharging Provider: Nazia Pelletier MD  Primary Care Provider: Jessica, Primary Doctor    Primary Care Team: Networked reference to record PCT     HPI:   33 y/o male with no significant past medical history presented with intractable nausea and vomiting x1 week.  Patient does smoke marijuana daily for many years but has not smoked since starting with the nausea and vomiting.  He has been to 2 separate ER visits, 1st on 09/02 and then again yesterday prior to presenting to this facility where he was diagnosed with cannabis hyperemesis syndrome and instructed to follow up with GI on the last ER visit.  He reports he saw Dr. Sanchez in clinic, and was prescribed a medication for nausea and something for her stomach this morning.  Patient reports he feels lightheaded, weak, and constant nausea.  He did reports some relief after getting fluid bolus, but now does not feel well again.  He was abdominal pain/discomfort in the midepigastric area, with no radiation of the pain.  No diarrhea.  No fever or chills. Last BM about 6 days ago. In the ER, workup remarkable for <1 phosphorous. Lipase normal, and transaminases normal.    * No surgery found *      Hospital Course:   Mr. Ramos presented with intractable N/V. Admitted with severe hypophosphatemia and dehydration due to GI loss. Workup with KUB with questionable focal dilated loop of small bowel. Further evaluated with CT of the abd/pelvis with IV contrast showed "no acute abdominopelvic abnormality." He was treated with IV fluids, electrolytes corrected and droperidol IV along with anti-emetics PRN. Clinically improved, volume status corrected, and diet was advanced " "without difficulty without further emesis. Patient was ruled out for other organic pathology which was negative, and therefore, he likely had cannabis hyperemesis syndrome as likely cause for his intractable nausea and vomiting. He was stable for discharge home, and he was educated to avoid further marijuana use and to continue taking his prescribed PPI, Bentyl and Zofran as needed by his GI physician.  Patient to follow up with Dr. Sanchez as previously arranged.     Intractable nausea and vomiting  Hypophosphatemia  Dehydration  Cannabis hyperemesis syndrome with current cannabis dependence  - Patient with long history of mariajuana use and no previous episodes in the past of cannabis hyperemesis syndrome. Volume depletion/dehydration that is likely contributing to his symptoms. No other obvious other sources at this time.   - Further workup with UA unremarkable. KUB with questionable focal dilated loop of small bowel. Further evaluated with CT of the abd/pelvis with IV contrast showed "no acute abdominopelvic abnormality."   - Corrected volume status with IV fluids with LR  - Supplemented with IV phosphorous, repeated laboratory testing and it was corrected to normal  - Treated with IV PPI Q12 and had scheduled droperidol IV Q6 and have anti-emetics PRN   - Symptomatically improved and patient's diet was advanced to regular without difficulty  - Educated that patient has cannabis hyperemesis syndrome as likely cause for his intractable nausea and vomiting.   - Encouraged to avoid further marijuana use and to continue taking his prescribed PPI, Bentyl and Zofran as needed by his GI physician and to follow up with Dr. Sanchez as previously arranged.       Goals of Care Treatment Preferences:  Code Status: Full Code     Consults: None    Service: Hospital Medicine    Final Active Diagnoses:    Diagnosis Date Noted POA    PRINCIPAL PROBLEM:  Intractable nausea and vomiting [R11.2] 09/06/2024 Yes    Cannabis hyperemesis " syndrome concurrent with and due to cannabis dependence [F12.288] 09/06/2024 Yes    Hypophosphatemia [E83.39] 09/06/2024 Yes    Dehydration [E86.0] 09/06/2024 Yes      Problems Resolved During this Admission:       Discharged Condition: good    Disposition: Home or Self Care    Follow Up:   Follow-up Information       Rene Sanchez MD Follow up in 1 week(s).    Specialty: Gastroenterology  Contact information:  [address] [place], [place] [id number] [phone]                           Patient Instructions:      Diet Adult Regular     Activity as tolerated       Significant Diagnostic Studies: N/A    Pending Diagnostic Studies:       None           Medications:  Reconciled Home Medications:      Medication List        CONTINUE taking these medications      dicyclomine 10 MG capsule  Commonly known as: BENTYL  Take 10 mg by mouth 4 (four) times daily before meals and nightly.     ondansetron 4 MG tablet  Commonly known as: ZOFRAN  Take 8 mg by mouth every 4 (four) hours as needed for Nausea.     pantoprazole 40 MG tablet  Commonly known as: PROTONIX  Take 40 mg by mouth once daily.              Indwelling Lines/Drains at time of discharge:   Lines/Drains/Airways       None                   Time spent on the discharge of patient: 35 minutes         Nazia Pelletier MD  Department of Hospital Medicine  [facility], [place], operated by [facility])

## 2024-09-07 NOTE — PROGRESS NOTES
Patient being discharged per MD orders. Ivs removed. Cath tips intact. Patient tolerated well. Discharge paperwork given and reviewed with patient. Patient instructed to  medications from his pharmacy that were previously prescribed by his Dr per . Patient verbalized understanding. Patient transported downstairs to his ride.

## 2024-09-09 ENCOUNTER — HOSPITAL ENCOUNTER (EMERGENCY)
Facility: OTHER | Age: 34
Discharge: HOME | End: 2024-09-09
Attending: EMERGENCY MEDICINE
Payer: MEDICAID

## 2024-09-09 VITALS
SYSTOLIC BLOOD PRESSURE: 146 MMHG | RESPIRATION RATE: 17 BRPM | OXYGEN SATURATION: 96 % | WEIGHT: 180 LBS | HEIGHT: 69 IN | BODY MASS INDEX: 26.66 KG/M2 | HEART RATE: 67 BPM | TEMPERATURE: 98 F | DIASTOLIC BLOOD PRESSURE: 76 MMHG

## 2024-09-09 DIAGNOSIS — N17.9 AKI (ACUTE KIDNEY INJURY): Primary | ICD-10-CM

## 2024-09-09 DIAGNOSIS — R11.2 NAUSEA & VOMITING: ICD-10-CM

## 2024-09-09 LAB
ALBUMIN SERPL BCP-MCNC: 4.8 G/DL (ref 3.5–5.2)
ALP SERPL-CCNC: 49 U/L (ref 55–135)
ALT SERPL W/O P-5'-P-CCNC: 19 U/L (ref 10–44)
ANION GAP SERPL CALC-SCNC: 17 MMOL/L (ref 8–16)
AST SERPL-CCNC: 27 U/L (ref 10–40)
BASOPHILS # BLD AUTO: 0.02 K/UL (ref 0–0.2)
BASOPHILS NFR BLD: 0.4 % (ref 0–1.9)
BILIRUB SERPL-MCNC: 1.6 MG/DL (ref 0.1–1)
BUN SERPL-MCNC: 11 MG/DL (ref 6–20)
CALCIUM SERPL-MCNC: 10.1 MG/DL (ref 8.7–10.5)
CHLORIDE SERPL-SCNC: 105 MMOL/L (ref 95–110)
CO2 SERPL-SCNC: 17 MMOL/L (ref 23–29)
CREAT SERPL-MCNC: 1.3 MG/DL (ref 0.5–1.4)
CREAT SERPL-MCNC: 1.5 MG/DL (ref 0.5–1.4)
DIFFERENTIAL METHOD BLD: ABNORMAL
EOSINOPHIL # BLD AUTO: 0 K/UL (ref 0–0.5)
EOSINOPHIL NFR BLD: 0 % (ref 0–8)
ERYTHROCYTE [DISTWIDTH] IN BLOOD BY AUTOMATED COUNT: 10.7 % (ref 11.5–14.5)
EST. GFR  (NO RACE VARIABLE): >60 ML/MIN/1.73 M^2
GLUCOSE SERPL-MCNC: 115 MG/DL (ref 70–110)
HCT VFR BLD AUTO: 44.5 % (ref 40–54)
HGB BLD-MCNC: 16.1 G/DL (ref 14–18)
IMM GRANULOCYTES # BLD AUTO: 0.01 K/UL (ref 0–0.04)
IMM GRANULOCYTES NFR BLD AUTO: 0.2 % (ref 0–0.5)
LIPASE SERPL-CCNC: 8 U/L (ref 4–60)
LYMPHOCYTES # BLD AUTO: 1.6 K/UL (ref 1–4.8)
LYMPHOCYTES NFR BLD: 35 % (ref 18–48)
MAGNESIUM SERPL-MCNC: 2 MG/DL (ref 1.6–2.6)
MCH RBC QN AUTO: 35.1 PG (ref 27–31)
MCHC RBC AUTO-ENTMCNC: 36.2 G/DL (ref 32–36)
MCV RBC AUTO: 97 FL (ref 82–98)
MONOCYTES # BLD AUTO: 0.5 K/UL (ref 0.3–1)
MONOCYTES NFR BLD: 10.9 % (ref 4–15)
NEUTROPHILS # BLD AUTO: 2.5 K/UL (ref 1.8–7.7)
NEUTROPHILS NFR BLD: 53.5 % (ref 38–73)
NRBC BLD-RTO: 0 /100 WBC
OHS QRS DURATION: 86 MS
OHS QTC CALCULATION: 445 MS
PHOSPHATE SERPL-MCNC: 2 MG/DL (ref 2.7–4.5)
PLATELET # BLD AUTO: 237 K/UL (ref 150–450)
PMV BLD AUTO: 9.1 FL (ref 9.2–12.9)
POTASSIUM SERPL-SCNC: 3.7 MMOL/L (ref 3.5–5.1)
PROT SERPL-MCNC: 8.6 G/DL (ref 6–8.4)
RBC # BLD AUTO: 4.59 M/UL (ref 4.6–6.2)
SAMPLE: NORMAL
SODIUM SERPL-SCNC: 139 MMOL/L (ref 136–145)
WBC # BLD AUTO: 4.66 K/UL (ref 3.9–12.7)

## 2024-09-09 PROCEDURE — 80053 COMPREHEN METABOLIC PANEL: CPT | Performed by: EMERGENCY MEDICINE

## 2024-09-09 PROCEDURE — 85025 COMPLETE CBC W/AUTO DIFF WBC: CPT | Performed by: EMERGENCY MEDICINE

## 2024-09-09 PROCEDURE — 84100 ASSAY OF PHOSPHORUS: CPT | Performed by: EMERGENCY MEDICINE

## 2024-09-09 PROCEDURE — 93005 ELECTROCARDIOGRAM TRACING: CPT

## 2024-09-09 PROCEDURE — 96374 THER/PROPH/DIAG INJ IV PUSH: CPT

## 2024-09-09 PROCEDURE — 83690 ASSAY OF LIPASE: CPT | Performed by: EMERGENCY MEDICINE

## 2024-09-09 PROCEDURE — 96361 HYDRATE IV INFUSION ADD-ON: CPT

## 2024-09-09 PROCEDURE — 96375 TX/PRO/DX INJ NEW DRUG ADDON: CPT

## 2024-09-09 PROCEDURE — 83735 ASSAY OF MAGNESIUM: CPT | Performed by: EMERGENCY MEDICINE

## 2024-09-09 PROCEDURE — 25000003 PHARM REV CODE 250: Performed by: EMERGENCY MEDICINE

## 2024-09-09 PROCEDURE — 93010 ELECTROCARDIOGRAM REPORT: CPT | Mod: ,,, | Performed by: INTERNAL MEDICINE

## 2024-09-09 PROCEDURE — 63600175 PHARM REV CODE 636 W HCPCS: Performed by: EMERGENCY MEDICINE

## 2024-09-09 PROCEDURE — 99284 EMERGENCY DEPT VISIT MOD MDM: CPT | Mod: 25

## 2024-09-09 RX ORDER — SODIUM CHLORIDE 9 MG/ML
1000 INJECTION, SOLUTION INTRAVENOUS
Status: COMPLETED | OUTPATIENT
Start: 2024-09-09 | End: 2024-09-09

## 2024-09-09 RX ORDER — DROPERIDOL 2.5 MG/ML
1.25 INJECTION, SOLUTION INTRAMUSCULAR; INTRAVENOUS
Status: COMPLETED | OUTPATIENT
Start: 2024-09-09 | End: 2024-09-09

## 2024-09-09 RX ORDER — METOCLOPRAMIDE 10 MG/1
10 TABLET ORAL EVERY 6 HOURS
Qty: 30 TABLET | Refills: 0 | Status: ON HOLD | OUTPATIENT
Start: 2024-09-09 | End: 2024-09-11

## 2024-09-09 RX ORDER — FAMOTIDINE 10 MG/ML
20 INJECTION INTRAVENOUS
Status: COMPLETED | OUTPATIENT
Start: 2024-09-09 | End: 2024-09-09

## 2024-09-09 RX ADMIN — SODIUM CHLORIDE 1000 ML: 9 INJECTION, SOLUTION INTRAVENOUS at 05:09

## 2024-09-09 RX ADMIN — SODIUM CHLORIDE 1000 ML: 9 INJECTION, SOLUTION INTRAVENOUS at 03:09

## 2024-09-09 RX ADMIN — DROPERIDOL 1.25 MG: 2.5 INJECTION, SOLUTION INTRAMUSCULAR; INTRAVENOUS at 04:09

## 2024-09-09 RX ADMIN — FAMOTIDINE 20 MG: 10 INJECTION, SOLUTION INTRAVENOUS at 03:09

## 2024-09-09 NOTE — DISCHARGE INSTRUCTIONS
Continue taking the medication prescribed to you by your gastroenterologist.  In addition, take the nausea medicine prescribed to you today as needed.

## 2024-09-09 NOTE — ED PROVIDER NOTES
Encounter Date: 9/9/2024       History     Chief Complaint   Patient presents with    Nausea    Vomiting     Patient discharged yesterday patient back at ER for similar issue unable to keep anything down     34-year-old male with history of daily marijuana presents complaining of nausea and vomiting and upper abdominal pain.  He was admitted from 09/06 through 9/7 for the same complaints and was diagnosed with hyperemesis cannabinoid syndrome.  The patient denies any new or worsening symptoms but states he has been unable to tolerate any liquids.  The abdominal pain started after the vomiting and he describes it as soreness.  He denies using marijuana since being discharged.      Review of patient's allergies indicates:  No Known Allergies  History reviewed. No pertinent past medical history.  History reviewed. No pertinent surgical history.  Family History   Problem Relation Name Age of Onset    Diabetes Mother       Social History     Tobacco Use    Smoking status: Every Day     Types: Cigarettes    Smokeless tobacco: Current   Substance Use Topics    Alcohol use: Yes     Comment: Social    Drug use: Yes     Types: Marijuana     Comment: Smokes daily for many years     Review of Systems    Physical Exam     Initial Vitals [09/09/24 0315]   BP Pulse Resp Temp SpO2   (!) 155/93 89 18 97.5 °F (36.4 °C) 99 %      MAP       --         Physical Exam    Nursing note and vitals reviewed.  Constitutional: He appears well-developed and well-nourished. He is not diaphoretic. No distress.   HENT:   Head: Normocephalic and atraumatic.   Right Ear: External ear normal.   Left Ear: External ear normal.   Nose: Nose normal.   Dry mucous membranes   Eyes: Conjunctivae and EOM are normal. Right eye exhibits no discharge. Left eye exhibits no discharge.   Neck: Neck supple. No JVD present.   Normal range of motion.  Cardiovascular:  Normal rate, regular rhythm and normal heart sounds.     Exam reveals no gallop and no friction rub.        No murmur heard.  Pulmonary/Chest: Breath sounds normal. No respiratory distress. He has no wheezes. He has no rhonchi. He has no rales.   Tachypneic   Abdominal: He exhibits no distension.   Diffuse abdominal tenderness to palpation There is no guarding.   Musculoskeletal:         General: Normal range of motion.      Cervical back: Normal range of motion and neck supple.     Neurological: He is alert and oriented to person, place, and time.   Skin: Skin is warm.         ED Course   Procedures  Labs Reviewed   CBC W/ AUTO DIFFERENTIAL - Abnormal       Result Value    WBC 4.66      RBC 4.59 (*)     Hemoglobin 16.1      Hematocrit 44.5      MCV 97      MCH 35.1 (*)     MCHC 36.2 (*)     RDW 10.7 (*)     Platelets 237      MPV 9.1 (*)     Immature Granulocytes 0.2      Gran # (ANC) 2.5      Immature Grans (Abs) 0.01      Lymph # 1.6      Mono # 0.5      Eos # 0.0      Baso # 0.02      nRBC 0      Gran % 53.5      Lymph % 35.0      Mono % 10.9      Eosinophil % 0.0      Basophil % 0.4      Differential Method Automated     PHOSPHORUS - Abnormal    Phosphorus 2.0 (*)    COMPREHENSIVE METABOLIC PANEL - Abnormal    Sodium 139      Potassium 3.7      Chloride 105      CO2 17 (*)     Glucose 115 (*)     BUN 11      Creatinine 1.5 (*)     Calcium 10.1      Total Protein 8.6 (*)     Albumin 4.8      Total Bilirubin 1.6 (*)     Alkaline Phosphatase 49 (*)     AST 27      ALT 19      eGFR >60      Anion Gap 17 (*)    MAGNESIUM    Magnesium 2.0     LIPASE    Lipase 8     ISTAT CREATININE    POC Creatinine 1.3      Sample VENOUS          ECG Results              EKG 12-lead (Final result)        Collection Time Result Time QRS Duration OHS QTC Calculation    09/09/24 04:36:58 09/09/24 12:51:43 86 445                     Final result by Interface, Lab In [facility] (09/09/24 12:51:46)                   Narrative:    Test Reason : R11.2,    Vent. Rate : 067 BPM     Atrial Rate : 067 BPM     P-R Int : 184 ms          QRS Dur : 086  ms      QT Int : 422 ms       P-R-T Axes : 270 088 071 degrees     QTc Int : 445 ms    Unusual P axis, possible ectopic atrial rhythm  Abnormal ECG    Confirmed by Jennifer PEREZ, Lenny OHARA ([id number]) on 9/9/2024 12:51:38 PM    Referred By: OSCAR   SELF           Confirmed By:Lenny Rodriguez MD                                  Imaging Results    None          Medications   0.9%  NaCl infusion (0 mLs Intravenous Stopped 9/9/24 0440)   famotidine (PF) injection 20 mg (20 mg Intravenous Given 9/9/24 0337)   droPERidol injection 1.25 mg (1.25 mg Intravenous Given 9/9/24 0425)   0.9%  NaCl infusion (0 mLs Intravenous Stopped 9/9/24 0606)     Medical Decision Making  34-year-old male with no significant past medical history other than cannabinoid hyperemesis syndrome presents complaining of nausea, vomiting, and upper abdominal pain.  Triage vital signs within normal limits.  During my exam he appeared clinically dehydrated and hyperventilating.  He also had diffuse abdominal tenderness on exam.      I did review his most recent ED visit and hospital admission.  He had a CT of the abdomen that showed no evidence of acute process and labs were remarkable for hypophosphatemia.  Will plan to repeat labs and give IV fluids and antiemetics as well as Pepcid.    Amount and/or Complexity of Data Reviewed  External Data Reviewed: notes.     Details: Per discharge summary from 09/07, the patient has seen Gastroenterology, Dr. Sanchez, and he has been prescribed Bentyl, ppi, and Zofran.  Labs: ordered.     Details: Labs significant for hypophosphatemia but above the level needing repletion.  CMP also significant for mild BETTY Cr 1.0 -> 1.5.  This trended down to 1.3 after 1 L of IV fluids.  Will plan to give additional IV fluids.    Risk  Prescription drug management.               ED Course as of 09/09/24 2235   Mon Sep 09, 2024   0536 Patient was tolerate ice chips.  Currently attempting water. [AA]   0615 Pt sitting on the edge of the bed  and reports feeling well.  He has been also able to tolerate water without pain or nausea.  Will d/c home at this time with Rx for reglan in addition to home meds Rx'ed by GI.   [AA]      ED Course User Index  [AA] Ernestine Dotson MD                           Clinical Impression:  Final diagnoses:  [R11.2] Nausea & vomiting  [N17.9] BETTY (acute kidney injury) (Primary)          ED Disposition Condition    Discharge Stable          ED Prescriptions       Medication Sig Dispense Start Date End Date Auth. Provider    metoclopramide HCl (REGLAN) 10 MG tablet Take 1 tablet (10 mg total) by mouth every 6 (six) hours. 30 tablet 9/9/2024 -- Ernestine Dotson MD          Follow-up Information       Follow up With Specialties Details Why Contact Info    Gastroenterology  Go to  As scheduled further evaluation     [facility] - Emergency Dept Emergency Medicine Go to  If symptoms worsen [address] [facility] [address]  [phone]             Ernestine Dotson MD  09/09/24 5859

## 2024-09-10 ENCOUNTER — HOSPITAL ENCOUNTER (INPATIENT)
Facility: OTHER | Age: 34
LOS: 4 days | Discharge: HOME | DRG: 897 | End: 2024-09-14
Attending: EMERGENCY MEDICINE | Admitting: INTERNAL MEDICINE
Payer: MEDICAID

## 2024-09-10 DIAGNOSIS — F12.90 CANNABINOID HYPEREMESIS SYNDROME: Primary | ICD-10-CM

## 2024-09-10 DIAGNOSIS — E86.0 DEHYDRATION: ICD-10-CM

## 2024-09-10 DIAGNOSIS — Z91.89 AT RISK FOR PROLONGED QT INTERVAL SYNDROME: ICD-10-CM

## 2024-09-10 DIAGNOSIS — K26.9 DUODENAL ULCER: ICD-10-CM

## 2024-09-10 DIAGNOSIS — R11.2 CANNABINOID HYPEREMESIS SYNDROME: Primary | ICD-10-CM

## 2024-09-10 DIAGNOSIS — R11.2 INTRACTABLE NAUSEA AND VOMITING: ICD-10-CM

## 2024-09-10 DIAGNOSIS — R10.13 EPIGASTRIC ABDOMINAL PAIN: ICD-10-CM

## 2024-09-10 LAB
ALBUMIN SERPL BCP-MCNC: 4.1 G/DL (ref 3.5–5.2)
ALP SERPL-CCNC: 39 U/L (ref 55–135)
ALT SERPL W/O P-5'-P-CCNC: 18 U/L (ref 10–44)
AMPHET+METHAMPHET UR QL: NEGATIVE
ANION GAP SERPL CALC-SCNC: 13 MMOL/L (ref 8–16)
AST SERPL-CCNC: 33 U/L (ref 10–40)
BARBITURATES UR QL SCN>200 NG/ML: NEGATIVE
BASOPHILS # BLD AUTO: 0.01 K/UL (ref 0–0.2)
BASOPHILS NFR BLD: 0.2 % (ref 0–1.9)
BENZODIAZ UR QL SCN>200 NG/ML: NEGATIVE
BILIRUB SERPL-MCNC: 1.3 MG/DL (ref 0.1–1)
BILIRUB UR QL STRIP: NEGATIVE
BUN SERPL-MCNC: 11 MG/DL (ref 6–20)
BZE UR QL SCN: NEGATIVE
CALCIUM SERPL-MCNC: 8.7 MG/DL (ref 8.7–10.5)
CANNABINOIDS UR QL SCN: ABNORMAL
CHLORIDE SERPL-SCNC: 106 MMOL/L (ref 95–110)
CLARITY UR: CLEAR
CO2 SERPL-SCNC: 20 MMOL/L (ref 23–29)
COLOR UR: YELLOW
CREAT SERPL-MCNC: 1.2 MG/DL (ref 0.5–1.4)
CREAT UR-MCNC: 342.4 MG/DL (ref 23–375)
DIFFERENTIAL METHOD BLD: ABNORMAL
EOSINOPHIL # BLD AUTO: 0 K/UL (ref 0–0.5)
EOSINOPHIL NFR BLD: 0 % (ref 0–8)
ERYTHROCYTE [DISTWIDTH] IN BLOOD BY AUTOMATED COUNT: 10.9 % (ref 11.5–14.5)
EST. GFR  (NO RACE VARIABLE): >60 ML/MIN/1.73 M^2
GLUCOSE SERPL-MCNC: 97 MG/DL (ref 70–110)
GLUCOSE UR QL STRIP: NEGATIVE
HCT VFR BLD AUTO: 39.5 % (ref 40–54)
HGB BLD-MCNC: 13.4 G/DL (ref 14–18)
HGB UR QL STRIP: NEGATIVE
IMM GRANULOCYTES # BLD AUTO: 0.02 K/UL (ref 0–0.04)
IMM GRANULOCYTES NFR BLD AUTO: 0.5 % (ref 0–0.5)
KETONES UR QL STRIP: ABNORMAL
LEUKOCYTE ESTERASE UR QL STRIP: ABNORMAL
LIPASE SERPL-CCNC: 10 U/L (ref 4–60)
LYMPHOCYTES # BLD AUTO: 1.4 K/UL (ref 1–4.8)
LYMPHOCYTES NFR BLD: 32.5 % (ref 18–48)
MAGNESIUM SERPL-MCNC: 2 MG/DL (ref 1.6–2.6)
MCH RBC QN AUTO: 34.4 PG (ref 27–31)
MCHC RBC AUTO-ENTMCNC: 33.9 G/DL (ref 32–36)
MCV RBC AUTO: 101 FL (ref 82–98)
METHADONE UR QL SCN>300 NG/ML: NEGATIVE
MICROSCOPIC COMMENT: ABNORMAL
MONOCYTES # BLD AUTO: 0.4 K/UL (ref 0.3–1)
MONOCYTES NFR BLD: 9.7 % (ref 4–15)
NEUTROPHILS # BLD AUTO: 2.5 K/UL (ref 1.8–7.7)
NEUTROPHILS NFR BLD: 57.1 % (ref 38–73)
NITRITE UR QL STRIP: NEGATIVE
NRBC BLD-RTO: 0 /100 WBC
OHS QRS DURATION: 98 MS
OHS QTC CALCULATION: 435 MS
OPIATES UR QL SCN: NEGATIVE
PCP UR QL SCN>25 NG/ML: NEGATIVE
PH UR STRIP: 7 [PH] (ref 5–8)
PHOSPHATE SERPL-MCNC: 2.1 MG/DL (ref 2.7–4.5)
PLATELET # BLD AUTO: 228 K/UL (ref 150–450)
PMV BLD AUTO: 10.2 FL (ref 9.2–12.9)
POTASSIUM SERPL-SCNC: 4.4 MMOL/L (ref 3.5–5.1)
PROT SERPL-MCNC: 7.7 G/DL (ref 6–8.4)
PROT UR QL STRIP: ABNORMAL
RBC # BLD AUTO: 3.9 M/UL (ref 4.6–6.2)
RBC #/AREA URNS HPF: 2 /HPF (ref 0–4)
SODIUM SERPL-SCNC: 139 MMOL/L (ref 136–145)
SP GR UR STRIP: 1.03 (ref 1–1.03)
SQUAMOUS #/AREA URNS HPF: 0 /HPF
TOXICOLOGY INFORMATION: ABNORMAL
URN SPEC COLLECT METH UR: ABNORMAL
UROBILINOGEN UR STRIP-ACNC: ABNORMAL EU/DL
WBC # BLD AUTO: 4.31 K/UL (ref 3.9–12.7)
WBC #/AREA URNS HPF: 8 /HPF (ref 0–5)

## 2024-09-10 PROCEDURE — 84100 ASSAY OF PHOSPHORUS: CPT | Performed by: EMERGENCY MEDICINE

## 2024-09-10 PROCEDURE — 80307 DRUG TEST PRSMV CHEM ANLYZR: CPT | Performed by: EMERGENCY MEDICINE

## 2024-09-10 PROCEDURE — 83690 ASSAY OF LIPASE: CPT | Performed by: EMERGENCY MEDICINE

## 2024-09-10 PROCEDURE — 83735 ASSAY OF MAGNESIUM: CPT | Performed by: EMERGENCY MEDICINE

## 2024-09-10 PROCEDURE — 63600175 PHARM REV CODE 636 W HCPCS: Performed by: NURSE PRACTITIONER

## 2024-09-10 PROCEDURE — 63600175 PHARM REV CODE 636 W HCPCS: Performed by: EMERGENCY MEDICINE

## 2024-09-10 PROCEDURE — 11000001 HC ACUTE MED/SURG PRIVATE ROOM

## 2024-09-10 PROCEDURE — 63600175 PHARM REV CODE 636 W HCPCS: Performed by: INTERNAL MEDICINE

## 2024-09-10 PROCEDURE — 25000003 PHARM REV CODE 250: Performed by: EMERGENCY MEDICINE

## 2024-09-10 PROCEDURE — 93010 ELECTROCARDIOGRAM REPORT: CPT | Mod: ,,, | Performed by: INTERNAL MEDICINE

## 2024-09-10 PROCEDURE — 25000003 PHARM REV CODE 250: Performed by: INTERNAL MEDICINE

## 2024-09-10 PROCEDURE — 93005 ELECTROCARDIOGRAM TRACING: CPT

## 2024-09-10 PROCEDURE — 81000 URINALYSIS NONAUTO W/SCOPE: CPT | Mod: 59 | Performed by: EMERGENCY MEDICINE

## 2024-09-10 PROCEDURE — 85025 COMPLETE CBC W/AUTO DIFF WBC: CPT | Performed by: EMERGENCY MEDICINE

## 2024-09-10 PROCEDURE — 80053 COMPREHEN METABOLIC PANEL: CPT | Performed by: EMERGENCY MEDICINE

## 2024-09-10 PROCEDURE — 94761 N-INVAS EAR/PLS OXIMETRY MLT: CPT

## 2024-09-10 RX ORDER — PROMETHAZINE HYDROCHLORIDE 25 MG/1
25 TABLET ORAL EVERY 6 HOURS PRN
Status: DISCONTINUED | OUTPATIENT
Start: 2024-09-10 | End: 2024-09-10

## 2024-09-10 RX ORDER — SUCRALFATE 1 G/10ML
1 SUSPENSION ORAL EVERY 6 HOURS
Status: DISCONTINUED | OUTPATIENT
Start: 2024-09-10 | End: 2024-09-11

## 2024-09-10 RX ORDER — PANTOPRAZOLE SODIUM 40 MG/1
40 TABLET, DELAYED RELEASE ORAL DAILY
Status: DISCONTINUED | OUTPATIENT
Start: 2024-09-10 | End: 2024-09-14 | Stop reason: HOSPADM

## 2024-09-10 RX ORDER — METOCLOPRAMIDE 10 MG/1
10 TABLET ORAL EVERY 6 HOURS PRN
Status: DISCONTINUED | OUTPATIENT
Start: 2024-09-10 | End: 2024-09-10

## 2024-09-10 RX ORDER — DICYCLOMINE HYDROCHLORIDE 10 MG/ML
20 INJECTION INTRAMUSCULAR 4 TIMES DAILY
Status: DISCONTINUED | OUTPATIENT
Start: 2024-09-10 | End: 2024-09-11

## 2024-09-10 RX ORDER — SODIUM CHLORIDE 9 MG/ML
INJECTION, SOLUTION INTRAVENOUS CONTINUOUS
Status: DISCONTINUED | OUTPATIENT
Start: 2024-09-10 | End: 2024-09-14 | Stop reason: HOSPADM

## 2024-09-10 RX ORDER — DIPHENHYDRAMINE HYDROCHLORIDE 50 MG/ML
12.5 INJECTION INTRAMUSCULAR; INTRAVENOUS
Status: COMPLETED | OUTPATIENT
Start: 2024-09-10 | End: 2024-09-10

## 2024-09-10 RX ORDER — FAMOTIDINE 10 MG/ML
20 INJECTION INTRAVENOUS
Status: COMPLETED | OUTPATIENT
Start: 2024-09-10 | End: 2024-09-10

## 2024-09-10 RX ORDER — DROPERIDOL 2.5 MG/ML
1.25 INJECTION, SOLUTION INTRAMUSCULAR; INTRAVENOUS
Status: COMPLETED | OUTPATIENT
Start: 2024-09-10 | End: 2024-09-10

## 2024-09-10 RX ORDER — ONDANSETRON HYDROCHLORIDE 2 MG/ML
4 INJECTION, SOLUTION INTRAVENOUS EVERY 8 HOURS PRN
Status: DISCONTINUED | OUTPATIENT
Start: 2024-09-10 | End: 2024-09-10

## 2024-09-10 RX ORDER — ACETAMINOPHEN 325 MG/1
650 TABLET ORAL EVERY 4 HOURS PRN
Status: DISCONTINUED | OUTPATIENT
Start: 2024-09-10 | End: 2024-09-14 | Stop reason: HOSPADM

## 2024-09-10 RX ORDER — SUCRALFATE 1 G/10ML
1 SUSPENSION ORAL
Status: COMPLETED | OUTPATIENT
Start: 2024-09-10 | End: 2024-09-10

## 2024-09-10 RX ORDER — LORAZEPAM 2 MG/ML
2 INJECTION INTRAMUSCULAR ONCE
Status: COMPLETED | OUTPATIENT
Start: 2024-09-10 | End: 2024-09-10

## 2024-09-10 RX ORDER — TALC
6 POWDER (GRAM) TOPICAL NIGHTLY PRN
Status: DISCONTINUED | OUTPATIENT
Start: 2024-09-10 | End: 2024-09-14 | Stop reason: HOSPADM

## 2024-09-10 RX ORDER — METOCLOPRAMIDE HYDROCHLORIDE 5 MG/5ML
10 SOLUTION ORAL EVERY 6 HOURS PRN
Status: DISCONTINUED | OUTPATIENT
Start: 2024-09-10 | End: 2024-09-14 | Stop reason: HOSPADM

## 2024-09-10 RX ORDER — ALUMINUM HYDROXIDE, MAGNESIUM HYDROXIDE, AND SIMETHICONE 1200; 120; 1200 MG/30ML; MG/30ML; MG/30ML
30 SUSPENSION ORAL
Status: DISCONTINUED | OUTPATIENT
Start: 2024-09-10 | End: 2024-09-14 | Stop reason: HOSPADM

## 2024-09-10 RX ORDER — ONDANSETRON HYDROCHLORIDE 2 MG/ML
8 INJECTION, SOLUTION INTRAVENOUS EVERY 6 HOURS
Status: DISCONTINUED | OUTPATIENT
Start: 2024-09-10 | End: 2024-09-14 | Stop reason: HOSPADM

## 2024-09-10 RX ORDER — SODIUM CHLORIDE 0.9 % (FLUSH) 0.9 %
10 SYRINGE (ML) INJECTION
Status: DISCONTINUED | OUTPATIENT
Start: 2024-09-10 | End: 2024-09-14 | Stop reason: HOSPADM

## 2024-09-10 RX ADMIN — SODIUM CHLORIDE 1000 ML: 9 INJECTION, SOLUTION INTRAVENOUS at 01:09

## 2024-09-10 RX ADMIN — ALUMINUM HYDROXIDE, MAGNESIUM HYDROXIDE, AND DIMETHICONE 30 ML: 200; 20; 200 SUSPENSION ORAL at 10:09

## 2024-09-10 RX ADMIN — POTASSIUM PHOSPHATE, MONOBASIC POTASSIUM PHOSPHATE, DIBASIC 30 MMOL: 224; 236 INJECTION, SOLUTION, CONCENTRATE INTRAVENOUS at 04:09

## 2024-09-10 RX ADMIN — SODIUM CHLORIDE: 9 INJECTION, SOLUTION INTRAVENOUS at 09:09

## 2024-09-10 RX ADMIN — ONDANSETRON 8 MG: 2 INJECTION INTRAMUSCULAR; INTRAVENOUS at 01:09

## 2024-09-10 RX ADMIN — ONDANSETRON 4 MG: 2 INJECTION INTRAMUSCULAR; INTRAVENOUS at 01:09

## 2024-09-10 RX ADMIN — ONDANSETRON 8 MG: 2 INJECTION INTRAMUSCULAR; INTRAVENOUS at 05:09

## 2024-09-10 RX ADMIN — SUCRALFATE 1 G: 1 SUSPENSION ORAL at 05:09

## 2024-09-10 RX ADMIN — PROMETHAZINE HYDROCHLORIDE 6.25 MG: 25 INJECTION INTRAMUSCULAR; INTRAVENOUS at 03:09

## 2024-09-10 RX ADMIN — PROMETHAZINE HYDROCHLORIDE 25 MG: 25 INJECTION INTRAMUSCULAR; INTRAVENOUS at 06:09

## 2024-09-10 RX ADMIN — DICYCLOMINE HYDROCHLORIDE 20 MG: 10 INJECTION, SOLUTION INTRAMUSCULAR at 02:09

## 2024-09-10 RX ADMIN — ALUMINUM HYDROXIDE, MAGNESIUM HYDROXIDE, AND DIMETHICONE 30 ML: 200; 20; 200 SUSPENSION ORAL at 05:09

## 2024-09-10 RX ADMIN — ONDANSETRON 8 MG: 2 INJECTION INTRAMUSCULAR; INTRAVENOUS at 11:09

## 2024-09-10 RX ADMIN — DROPERIDOL 1.25 MG: 2.5 INJECTION, SOLUTION INTRAMUSCULAR; INTRAVENOUS at 02:09

## 2024-09-10 RX ADMIN — DIPHENHYDRAMINE HYDROCHLORIDE 12.5 MG: 50 INJECTION, SOLUTION INTRAMUSCULAR; INTRAVENOUS at 02:09

## 2024-09-10 RX ADMIN — SUCRALFATE 1 G: 1 SUSPENSION ORAL at 06:09

## 2024-09-10 RX ADMIN — DICYCLOMINE HYDROCHLORIDE 20 MG: 10 INJECTION, SOLUTION INTRAMUSCULAR at 05:09

## 2024-09-10 RX ADMIN — FAMOTIDINE 20 MG: 10 INJECTION, SOLUTION INTRAVENOUS at 02:09

## 2024-09-10 RX ADMIN — SUCRALFATE 1 G: 1 SUSPENSION ORAL at 11:09

## 2024-09-10 RX ADMIN — DICYCLOMINE HYDROCHLORIDE 20 MG: 10 INJECTION, SOLUTION INTRAMUSCULAR at 08:09

## 2024-09-10 RX ADMIN — LORAZEPAM 2 MG: 2 INJECTION INTRAMUSCULAR; INTRAVENOUS at 08:09

## 2024-09-10 RX ADMIN — ALUMINUM HYDROXIDE, MAGNESIUM HYDROXIDE, AND DIMETHICONE 30 ML: 200; 20; 200 SUSPENSION ORAL at 06:09

## 2024-09-10 RX ADMIN — PANTOPRAZOLE SODIUM 40 MG: 40 TABLET, DELAYED RELEASE ORAL at 10:09

## 2024-09-10 RX ADMIN — ALUMINUM HYDROXIDE, MAGNESIUM HYDROXIDE, AND DIMETHICONE 30 ML: 200; 20; 200 SUSPENSION ORAL at 09:09

## 2024-09-10 NOTE — ED NOTES
Report received from MALIKA Bermudez. Patient is resting comfortably in bed, alert and oriented x4. Patient appears in no acute distress. Respirations are even & unlabored, patient denies dyspnea or pain at this time. Pt reports mild nausea but denies any vomiting since last dose of medications. Comfort and restroom needs addressed. Call light and personal items placed within patient reach. Bed is in lowest position with bed wheels locked and siderails up x2. Patient on continuous cardiac monitoring & pulse ox with non-invasive blood pressure cycling every 30 min. Care continues.

## 2024-09-10 NOTE — HPI
Mr. Ramos is a 34/M with no significant PMH who presented to [facility] 09/10 with an approximately 10 day history of intractable nausea and vomiting. He has been to the ED and placed in observation on several occasions: 09/02, 09/05, 09/06-09/07, and 09/09. He presented with persistent symptoms on 09/10 once again. During his prior evaluations he underwent KUB and CT Abd/Pelvis were performed without noted intra-abdominal abnormality. Reports PO intake makes symptoms worse and improves with taking hot baths. Was referred to GI during prior workups. He notes intermittent chills, diaphoresis, abdominal cramping, nausea, vomiting. He notes black stool x1 but no coffee-ground emesis or nayana blood in stool or emesis. ED evaluation was notable for persistent nausea and vomiting that failed to respond to PRN antiemetics. Hospital medicine was contacted for observation placement.

## 2024-09-10 NOTE — ED TRIAGE NOTES
"Elder Ramos, a 34 y.o. male presents to the ED w/ complaint of Nausea and Abdominal Pain. Reports "not being able to hold anything down, and last ate 10 days ago." Denies s/s of CP, SOB, Dizziness Diarrhea, fever.  AAOx4. NAD noted.     Triage note:  Chief Complaint   Patient presents with    Nausea    Abdominal Pain     Patient sen yesterday and discharge states he cannot keep anything down     Review of patient's allergies indicates:  No Known Allergies  History reviewed. No pertinent past medical history.       APPEARANCE: awake and alert in NAD.  SKIN: warm, dry and intact. No breakdown or bruising.  MUSCULOSKELETAL: Patient moving all extremities spontaneously, no obvious swelling or deformities noted. Ambulates independently.  RESPIRATORY: Denies shortness of breath.Respirations unlabored.   CARDIAC: Denies CP, 2+ distal pulses; no peripheral edema  ABDOMEN: S/ND/NT, Reports nausea.   : voids spontaneously, denies difficulty  Neurologic: AAO x 4; follows commands equal strength in all extremities; denies numbness/tingling. Denies dizziness, RENATO.    "

## 2024-09-10 NOTE — ED PROVIDER NOTES
Chief complaint:  Nausea and Abdominal Pain (Patient sen yesterday and discharge states he cannot keep anything down)      Source of information:  Patient, significant other, old chart    HPI:  Elder Ramos is a 34 y.o. male presenting with complaint of ongoing nausea and vomiting, unrelieved by dissolvable Zofran and 2 other nausea medicines that he can not remember the name of.  Significant other reports that he has been unable to tolerate any solids or liquids today.  The patient is complaining of abdominal pain throughout the abdomen.  Denies fever.  No diarrhea or blood per rectum or melena.  Patient denies recent use of marijuana, in at least a week.  Denies tobacco and alcohol.  No other acute complaints    ROS: As per HPI    Review of patient's allergies indicates:  No Known Allergies    Current Facility-Administered Medications on File Prior to Encounter   Medication Dose Route Frequency Provider Last Rate Last Admin    [COMPLETED] 0.9%  NaCl infusion  1,000 mL Intravenous ED 1 Time Ernestine Dotson MD   Stopped at 09/09/24 0440    [COMPLETED] 0.9%  NaCl infusion  1,000 mL Intravenous ED 1 Time Ernestine Dotson MD   Stopped at 09/09/24 0606    [COMPLETED] droPERidol injection 1.25 mg  1.25 mg Intravenous ED 1 Time Ernestine Dotson MD   1.25 mg at 09/09/24 0425    [COMPLETED] famotidine (PF) injection 20 mg  20 mg Intravenous ED 1 Time Ernestine Dotson MD   20 mg at 09/09/24 0337     Current Outpatient Medications on File Prior to Encounter   Medication Sig Dispense Refill    dicyclomine (BENTYL) 10 MG capsule Take 10 mg by mouth 4 (four) times daily before meals and nightly.      metoclopramide HCl (REGLAN) 10 MG tablet Take 1 tablet (10 mg total) by mouth every 6 (six) hours. 30 tablet 0    ondansetron (ZOFRAN) 4 MG tablet Take 8 mg by mouth every 4 (four) hours as needed for Nausea.      pantoprazole (PROTONIX) 40 MG tablet Take 40 mg by mouth once daily.         PMH:  As per HPI and below:  No past medical  history on file.  No past surgical history on file.      Physical Exam:    Vitals:    09/10/24 0002   BP: (!) 143/89   Pulse: 92   Resp: 18   Temp: 97.9 °F (36.6 °C)       General:  Uncomfortable appearing.  No acute distress. Well developed. Well nourished.  Eyes: PERRL. EOM intact. no photophobia, no nystagmus  Conjunctivae - no pallor or icterus.   ENT: HEAD: Normal - atraumatic. Normal external ears. Normal nose.  No facial asymmetry. Mucous membranes - dry.  Neck: Neck supple. no meningismus.  No thyromegaly.  No JVD.  Cardiovascular: Regular rate and rhythm. Normal S1 and S2. No murmur. No gallop. No rub.  2+ peripheral pulses  Respiratory: Normal breath sounds. No rales. No rhonchi. No wheezes. No tachypnea with exertion.  GI: Soft.  Nonspecific tenderness throughout the upper abdomen. No guarding. No rebound. Normal BS.  Musculoskeletal:  Normal weight-bearing and gait No deformities. Normal ROM x4.   Integument: No acute skin rashes. No clubbing or cyanosis  Neurologic: No gross neurological deficits.   Psychiatric: Awake, alert.  Oriented x3.  Normal speech and mentation.        Labs Reviewed   CBC W/ AUTO DIFFERENTIAL   COMPREHENSIVE METABOLIC PANEL   LIPASE   URINALYSIS, REFLEX TO URINE CULTURE   DRUG SCREEN PANEL, URINE EMERGENCY       Medications   sodium chloride 0.9% bolus 1,000 mL 1,000 mL (has no administration in time range)       Medical Decision Making  Differential diagnosis includes pancreatitis, hepatitis, cholecystitis, gastritis, cannabinoid hyperemesis syndrome, dehydration    Amount and/or Complexity of Data Reviewed  Independent Historian: spouse  External Data Reviewed: labs, radiology and notes.  Labs: ordered.    Risk  Decision regarding hospitalization.  Diagnosis or treatment significantly limited by social determinants of health.          MDM:    34 y.o. male with recent evaluations for nausea and vomiting felt to be due to cannabinoid hyperemesis syndrome.  He has seen GI but has  not yet had an upper endoscopy.  The family members were also worried that he has not yet received a colonoscopy due to a family history of colon cancer, reassured them that these symptoms are not indicative of a colon cancer and that colonoscopy is not the initial test that he needs, rather an upper endoscopy would be of greater utility.  Will obtain laboratory studies to ensure there is no detrimental change since previous.  He has already had a recent CT scan I do not think repeat imaging is warranted at this time.  Will administer IV fluids, antiemetics and patient's care will be turned over at 1:00 a.m. pending re-evaluation and response to the above interventions    Medications   sodium chloride 0.9% bolus 1,000 mL 1,000 mL (has no administration in time range)       ASSESSMENT:   1. Cannabinoid hyperemesis syndrome    2. Dehydration             Vinay Royal II, MD  09/10/24 0108

## 2024-09-10 NOTE — H&P
ED Observation Unit  History and Physical      I assumed care of this patient from the Main ED at onset of observation time, 0400 on 09/10/2024. I assumed care of this patient at the beginning of my shift at 1100.      History of Present Illness:    Elder Ramos is a 34 y.o. male presenting with complaint of ongoing nausea and vomiting, unrelieved by dissolvable Zofran and 2 other nausea medicines that he can not remember the name of.  Significant other reports that he has been unable to tolerate any solids or liquids today.  The patient is complaining of abdominal pain throughout the abdomen.  Denies fever.  No diarrhea or blood per rectum or melena.  Patient denies recent use of marijuana, in at least a week.  Denies tobacco and alcohol.  No other acute complaints.    I reviewed the ED Provider Note dated 9/10/24 prior to my evaluation of this patient.  I reviewed all labs and imaging performed in the Main ED, prior to patient being placed in Observation. Patient was placed in the ED Observation Unit for <principal problem not specified>.    ED Course:  Labs unremarkable in the ED. patient able to tolerate water in ED but concerned due to repeat ED visits for similar complaints.  Patient placed in ED or you for hydration, antiemetics and p.o. challenge.     PMHx   History reviewed. No pertinent past medical history.   History reviewed. No pertinent surgical history.     Family Hx   Family History   Problem Relation Name Age of Onset    Diabetes Mother          Social Hx   Social History     Socioeconomic History    Marital status: Single   Tobacco Use    Smoking status: Every Day     Types: Cigarettes    Smokeless tobacco: Current   Substance and Sexual Activity    Alcohol use: Yes     Comment: Social    Drug use: Yes     Types: Marijuana     Comment: Smokes daily for many years    Sexual activity: Yes     Social Determinants of Health     Financial Resource Strain: Low Risk  (9/7/2024)    Overall Financial  Resource Strain (CARDIA)     Difficulty of Paying Living Expenses: Not hard at all   Food Insecurity: No Food Insecurity (9/7/2024)    Hunger Vital Sign     Worried About Running Out of Food in the Last Year: Never true     Ran Out of Food in the Last Year: Never true   Transportation Needs: No Transportation Needs (9/7/2024)    TRANSPORTATION NEEDS     Transportation : No   Physical Activity: Sufficiently Active (9/7/2024)    Exercise Vital Sign     Days of Exercise per Week: 4 days     Minutes of Exercise per Session: 40 min   Stress: No Stress Concern Present (9/7/2024)    [employer] [place] [place] [employer] - Occupational Stress Questionnaire     Feeling of Stress : Only a little   Housing Stability: Low Risk  (9/7/2024)    Housing Stability Vital Sign     Unable to Pay for Housing in the Last Year: No     Homeless in the Last Year: No        Vital Signs   Vitals:    09/10/24 0702 09/10/24 0704 09/10/24 0842 09/10/24 0943   BP: 128/73  123/71 135/79   BP Location: Left arm  Left arm    Patient Position: Lying  Lying    Pulse: (!) 54 (!) 53 (!) 57 (!) 51   Resp: 16 19 20 20   Temp:       TempSrc:       SpO2: 97% 98% 100% 100%   Weight:       Height:            Review of Systems  Review of Systems   Constitutional:  Positive for malaise/fatigue. Negative for chills and fever.   Respiratory:  Negative for cough, shortness of breath and wheezing.    Cardiovascular:  Negative for chest pain, palpitations, leg swelling and PND.   Gastrointestinal:  Positive for abdominal pain, nausea and vomiting. Negative for constipation and diarrhea.   Genitourinary:  Negative for frequency and urgency.   Musculoskeletal:  Negative for back pain and myalgias.   Neurological:  Negative for dizziness, focal weakness, weakness and headaches.   All other systems reviewed and are negative.      Physical Exam  Physical Exam  Vitals and nursing note reviewed.   Constitutional:       General: He is in acute distress.      Appearance:  Normal appearance. He is ill-appearing. He is not toxic-appearing.   HENT:      Head: Normocephalic and atraumatic.      Nose: Nose normal.      Mouth/Throat:      Lips: Pink.      Mouth: Mucous membranes are moist.   Eyes:      Pupils: Pupils are equal, round, and reactive to light.   Neck:      Vascular: No JVD.      Trachea: Phonation normal.   Cardiovascular:      Rate and Rhythm: Normal rate and regular rhythm.      Heart sounds: Normal heart sounds.   Pulmonary:      Effort: Pulmonary effort is normal.      Breath sounds: Normal breath sounds.   Abdominal:      General: Abdomen is flat. Bowel sounds are normal.      Palpations: Abdomen is soft.      Tenderness: There is generalized abdominal tenderness. There is no guarding or rebound.   Musculoskeletal:         General: Normal range of motion.   Skin:     General: Skin is warm and dry.   Neurological:      Mental Status: He is oriented to person, place, and time and easily aroused.   Psychiatric:         Behavior: Behavior is cooperative.         Medications:   Scheduled Meds:   aluminum-magnesium hydroxide-simethicone  30 mL Oral QID (AC & HS)    pantoprazole  40 mg Oral Daily    sucralfate  1 g Oral Q6H     Continuous Infusions:   0.9% NaCl   Intravenous Continuous 125 mL/hr at 09/10/24 0458 Restarted at 09/10/24 0458     PRN Meds:.  Current Facility-Administered Medications:     melatonin, 6 mg, Oral, Nightly PRN    ondansetron, 4 mg, Intravenous, Q8H PRN    promethazine, 25 mg, Oral, Q6H PRN      Assessment/Plan:    - cannabinoid hyperemesis syndrome   - Vital signs/ Pulse ox Q4H   - NSS infusion @ 125ml/hr   -Medications    - GI cocktail 30ml QID    - Pantoprazole 40mg PO Daily    - Sucralfate 1gm PO Q6H    - Zofran 4mg IV Q8H PRN nausea    - Promethazine 25mg PO Q6H PRN nausea   - Diet    - Liquid diet, advance as tolerated    Case was discussed with the ED provider, Dr. Marquez

## 2024-09-10 NOTE — ED NOTES
Pt ambulatory to restroom, reports no other needs at this time. Not drinking water at this time but denies any needs. Awaiting transport. Family at bedside, call light in reach.

## 2024-09-10 NOTE — Clinical Note
Diagnosis: Cannabinoid hyperemesis syndrome [1872297]   Reason for IP Medical Treatment  (Clinical interventions that can only be accomplished in the IP setting? ) :: IVF, antiemetics   Plans for Post-Acute care--if anticipated (pick the single best option):: A. No post acute care anticipated at this time

## 2024-09-10 NOTE — ASSESSMENT & PLAN NOTE
- Schedule ondansetron 8mg IV q6hr; continue promethazine 25mg as IV q6hr PRN, start metoclopramide 10mg IV q6hr PRN.  - Continue IVFs with NS.  - Reports dark stool but without other evidence of acute GI bleed / blood losses. Mild decrease in Hgb likely due to hemoconcentration / IVFs. Trend CBC with AM labs.  - Start dicyclomine 20mg IM four times daily.  - Continue pantoprazole 40mg PO daily, sucralfate 1g PO q6hr.

## 2024-09-10 NOTE — ED NOTES
Pt AAOx4, ambulated to bathroom with steady gait, no assistance needed, reports not needing O2 at this time

## 2024-09-10 NOTE — ED NOTES
Labs redrawn and sent at this time.  Lab unable to find previous labs collected and sent by this nurse.

## 2024-09-10 NOTE — H&P
[facility] Emergency [facility] Medicine  History & Physical    Patient Name: Elder Ramos  MRN: [id number]  Patient Class: IP- Inpatient  Admission Date: 9/10/2024  Attending Physician: HUNTER Zaragoza MD  Primary Care Provider: Jessica, Primary Doctor    Patient information was obtained from patient, parent, past medical records, and ER records.     Subjective:     Principal Problem:Cannabis hyperemesis syndrome concurrent with and due to cannabis dependence    Chief Complaint:   Chief Complaint   Patient presents with    Nausea    Abdominal Pain     Patient sen yesterday and discharge states he cannot keep anything down        HPI: Mr. Ramos is a 34/M with no significant PMH who presented to [facility] 09/10 with an approximately 10 day history of intractable nausea and vomiting. He has been to the ED and placed in observation on several occasions: 09/02, 09/05, 09/06-09/07, and 09/09. He presented with persistent symptoms on 09/10 once again. During his prior evaluations he underwent KUB and CT Abd/Pelvis were performed without noted intra-abdominal abnormality. Reports PO intake makes symptoms worse and improves with taking hot baths. Was referred to GI during prior workups. He notes intermittent chills, diaphoresis, abdominal cramping, nausea, vomiting. He notes black stool x1 but no coffee-ground emesis or nayana blood in stool or emesis. ED evaluation was notable for persistent nausea and vomiting that failed to respond to PRN antiemetics. Hospital medicine was contacted for observation placement.    History reviewed. No pertinent past medical history.    History reviewed. No pertinent surgical history.    Review of patient's allergies indicates:  No Known Allergies    No current facility-administered medications on file prior to encounter.     Current Outpatient Medications on File Prior to Encounter   Medication Sig    dicyclomine (BENTYL) 10 MG capsule Take 10 mg by mouth 4 (four) times daily before  meals and nightly.    metoclopramide HCl (REGLAN) 10 MG tablet Take 1 tablet (10 mg total) by mouth every 6 (six) hours.    ondansetron (ZOFRAN) 4 MG tablet Take 8 mg by mouth every 4 (four) hours as needed for Nausea.    pantoprazole (PROTONIX) 40 MG tablet Take 40 mg by mouth once daily.     Family History       Problem Relation (Age of Onset)    Diabetes Mother          Tobacco Use    Smoking status: Never    Smokeless tobacco: Never   Substance and Sexual Activity    Alcohol use: Yes     Comment: Social    Drug use: Yes     Types: Marijuana     Comment: Smokes daily for many years    Sexual activity: Yes     Review of Systems   Constitutional:  Positive for chills and diaphoresis. Negative for fever.   HENT:  Negative for sore throat and trouble swallowing.    Eyes:  Negative for pain and visual disturbance.   Respiratory:  Positive for shortness of breath. Negative for cough.    Cardiovascular:  Negative for chest pain and palpitations.   Gastrointestinal:  Positive for abdominal pain, nausea and vomiting.   Genitourinary:  Negative for difficulty urinating and dysuria.   Musculoskeletal:  Negative for arthralgias and joint swelling.   Skin:  Negative for rash and wound.   Neurological:  Negative for weakness and numbness.     Objective:     Vital Signs (Most Recent):  Temp: 97.9 °F (36.6 °C) (09/10/24 0002)  Pulse: 82 (09/10/24 1346)  Resp: 20 (09/10/24 0943)  BP: (!) 146/81 (09/10/24 1346)  SpO2: 99 % (09/10/24 1346) Vital Signs (24h Range):  Temp:  [97.9 °F (36.6 °C)] 97.9 °F (36.6 °C)  Pulse:  [51-92] 82  Resp:  [13-20] 20  SpO2:  [97 %-100 %] 99 %  BP: (123-146)/(70-89) 146/81     Weight: 81.6 kg (180 lb)  Body mass index is 27.37 kg/m².     Physical Exam  Vitals and nursing note reviewed.   Constitutional:       General: He is not in acute distress.     Appearance: He is well-developed. He is ill-appearing.   HENT:      Head: Normocephalic and atraumatic.   Eyes:      General:         Right eye: No  discharge.         Left eye: No discharge.      Conjunctiva/sclera: Conjunctivae normal.   Cardiovascular:      Rate and Rhythm: Normal rate.      Pulses: Normal pulses.   Pulmonary:      Effort: Pulmonary effort is normal. No respiratory distress.   Abdominal:      General: There is no distension.      Palpations: Abdomen is soft.      Tenderness: There is abdominal tenderness (epigastric).   Musculoskeletal:         General: Normal range of motion.      Right lower leg: No edema.      Left lower leg: No edema.   Skin:     General: Skin is warm and dry.   Neurological:      Mental Status: He is alert and oriented to person, place, and time.        Significant Labs:   CBC:  Recent Labs   Lab 09/09/24  0331 09/10/24  0211   WBC 4.66 4.31   HGB 16.1 13.4*   HCT 44.5 39.5*    228   GRAN 53.5  2.5 57.1  2.5   LYMPH 35.0  1.6 32.5  1.4   MONO 10.9  0.5 9.7  0.4   EOS 0.0 0.0   BASO 0.02 0.01   CMP:  Recent Labs   Lab 09/09/24  0331 09/10/24  0211    139   K 3.7 4.4    106   CO2 17* 20*   BUN 11 11   CREATININE 1.5* 1.2   * 97   CALCIUM 10.1 8.7   MG 2.0 2.0   PHOS 2.0* 2.1*   ALKPHOS 49* 39*   AST 27 33   ALT 19 18   BILITOT 1.6* 1.3*   PROT 8.6* 7.7   ALBUMIN 4.8 4.1   ANIONGAP 17* 13      Significant Imaging:   No new imaging at time of admission. Reviewed/interpreted imaging from 09/06.  Assessment/Plan:     * Cannabis hyperemesis syndrome concurrent with and due to cannabis dependence  - Schedule ondansetron 8mg IV q6hr; continue promethazine 25mg as IV q6hr PRN, start metoclopramide 10mg IV q6hr PRN.  - Continue IVFs with NS.  - Reports dark stool but without other evidence of acute GI bleed / blood losses. Mild decrease in Hgb likely due to hemoconcentration / IVFs. Trend CBC with AM labs.  - Start dicyclomine 20mg IM four times daily.  - Continue pantoprazole 40mg PO daily, sucralfate 1g PO q6hr.    Hypophosphatemia  - Due to poor PO intake with hyperemesis.  - Replete PRN.      VTE  Risk Mitigation (From admission, onward)           Ordered     Place sequential compression device  Until discontinued         09/10/24 0354     IP VTE LOW RISK PATIENT  Once         09/10/24 0354                   D Jamil Zaragoza MD  Department of Hospital Medicine  [facility] - Emergency Dept

## 2024-09-10 NOTE — PLAN OF CARE
Patient unable to tolerate CL diet tonight. C/O abdominal pain, then vomited. Pain resolved after vomit. Started PRN phenergan. Family at bedside. Plan of care discussed.

## 2024-09-10 NOTE — SUBJECTIVE & OBJECTIVE
History reviewed. No pertinent past medical history.    History reviewed. No pertinent surgical history.    Review of patient's allergies indicates:  No Known Allergies    No current facility-administered medications on file prior to encounter.     Current Outpatient Medications on File Prior to Encounter   Medication Sig    dicyclomine (BENTYL) 10 MG capsule Take 10 mg by mouth 4 (four) times daily before meals and nightly.    metoclopramide HCl (REGLAN) 10 MG tablet Take 1 tablet (10 mg total) by mouth every 6 (six) hours.    ondansetron (ZOFRAN) 4 MG tablet Take 8 mg by mouth every 4 (four) hours as needed for Nausea.    pantoprazole (PROTONIX) 40 MG tablet Take 40 mg by mouth once daily.     Family History       Problem Relation (Age of Onset)    Diabetes Mother          Tobacco Use    Smoking status: Never    Smokeless tobacco: Never   Substance and Sexual Activity    Alcohol use: Yes     Comment: Social    Drug use: Yes     Types: Marijuana     Comment: Smokes daily for many years    Sexual activity: Yes     Review of Systems   Constitutional:  Positive for chills and diaphoresis. Negative for fever.   HENT:  Negative for sore throat and trouble swallowing.    Eyes:  Negative for pain and visual disturbance.   Respiratory:  Positive for shortness of breath. Negative for cough.    Cardiovascular:  Negative for chest pain and palpitations.   Gastrointestinal:  Positive for abdominal pain, nausea and vomiting.   Genitourinary:  Negative for difficulty urinating and dysuria.   Musculoskeletal:  Negative for arthralgias and joint swelling.   Skin:  Negative for rash and wound.   Neurological:  Negative for weakness and numbness.     Objective:     Vital Signs (Most Recent):  Temp: 97.9 °F (36.6 °C) (09/10/24 0002)  Pulse: 82 (09/10/24 1346)  Resp: 20 (09/10/24 0943)  BP: (!) 146/81 (09/10/24 1346)  SpO2: 99 % (09/10/24 1346) Vital Signs (24h Range):  Temp:  [97.9 °F (36.6 °C)] 97.9 °F (36.6 °C)  Pulse:  [51-92]  82  Resp:  [13-20] 20  SpO2:  [97 %-100 %] 99 %  BP: (123-146)/(70-89) 146/81     Weight: 81.6 kg (180 lb)  Body mass index is 27.37 kg/m².     Physical Exam  Vitals and nursing note reviewed.   Constitutional:       General: He is not in acute distress.     Appearance: He is well-developed. He is ill-appearing.   HENT:      Head: Normocephalic and atraumatic.   Eyes:      General:         Right eye: No discharge.         Left eye: No discharge.      Conjunctiva/sclera: Conjunctivae normal.   Cardiovascular:      Rate and Rhythm: Normal rate.      Pulses: Normal pulses.   Pulmonary:      Effort: Pulmonary effort is normal. No respiratory distress.   Abdominal:      General: There is no distension.      Palpations: Abdomen is soft.      Tenderness: There is abdominal tenderness (epigastric).   Musculoskeletal:         General: Normal range of motion.      Right lower leg: No edema.      Left lower leg: No edema.   Skin:     General: Skin is warm and dry.   Neurological:      Mental Status: He is alert and oriented to person, place, and time.        Significant Labs:   CBC:  Recent Labs   Lab 09/09/24  0331 09/10/24  0211   WBC 4.66 4.31   HGB 16.1 13.4*   HCT 44.5 39.5*    228   GRAN 53.5  2.5 57.1  2.5   LYMPH 35.0  1.6 32.5  1.4   MONO 10.9  0.5 9.7  0.4   EOS 0.0 0.0   BASO 0.02 0.01   CMP:  Recent Labs   Lab 09/09/24  0331 09/10/24  0211    139   K 3.7 4.4    106   CO2 17* 20*   BUN 11 11   CREATININE 1.5* 1.2   * 97   CALCIUM 10.1 8.7   MG 2.0 2.0   PHOS 2.0* 2.1*   ALKPHOS 49* 39*   AST 27 33   ALT 19 18   BILITOT 1.6* 1.3*   PROT 8.6* 7.7   ALBUMIN 4.8 4.1   ANIONGAP 17* 13      Significant Imaging:   No new imaging at time of admission. Reviewed/interpreted imaging from 09/06.

## 2024-09-10 NOTE — ED NOTES
Pt appears very sleepy and O2 sats drop to the low 80's, placed on Oxygen 2 liters and provider made aware.    O2 increased to 96% post oxygen application

## 2024-09-10 NOTE — PROVIDER PROGRESS NOTES - EMERGENCY DEPT.
ED Observation Unit  Progress Note      HPI   Elder Ramos is a 34 y.o. male presenting with complaint of ongoing nausea and vomiting, unrelieved by dissolvable Zofran and 2 other nausea medicines that he can not remember the name of.  Significant other reports that he has been unable to tolerate any solids or liquids today.  The patient is complaining of abdominal pain throughout the abdomen.  Denies fever.  No diarrhea or blood per rectum or melena.  Patient denies recent use of marijuana, in at least a week.  Denies tobacco and alcohol.  No other acute complaints.     Interval History   Patient unable to tolerate p.o. ED.  Patient vomiting with liquids.  Discussed case with hospital medicine.  Will admit for further evaluation treatment of cannabis hyperemesis.  P.r.n. antiemetics ordered and given with minimal resolution.    PMHx   History reviewed. No pertinent past medical history.   History reviewed. No pertinent surgical history.     Family Hx   Family History   Problem Relation Name Age of Onset    Diabetes Mother          Social Hx   Social History     Socioeconomic History    Marital status: Single   Tobacco Use    Smoking status: Every Day     Types: Cigarettes    Smokeless tobacco: Current   Substance and Sexual Activity    Alcohol use: Yes     Comment: Social    Drug use: Yes     Types: Marijuana     Comment: Smokes daily for many years    Sexual activity: Yes     Social Determinants of Health     Financial Resource Strain: Low Risk  (9/7/2024)    Overall Financial Resource Strain (CARDIA)     Difficulty of Paying Living Expenses: Not hard at all   Food Insecurity: No Food Insecurity (9/7/2024)    Hunger Vital Sign     Worried About Running Out of Food in the Last Year: Never true     Ran Out of Food in the Last Year: Never true   Transportation Needs: No Transportation Needs (9/7/2024)    TRANSPORTATION NEEDS     Transportation : No   Physical Activity: Sufficiently Active (9/7/2024)    Exercise  Vital Sign     Days of Exercise per Week: 4 days     Minutes of Exercise per Session: 40 min   Stress: No Stress Concern Present (9/7/2024)    [place] [facility] [employer] [facility] - Occupational Stress Questionnaire     Feeling of Stress : Only a little   Housing Stability: Low Risk  (9/7/2024)    Housing Stability Vital Sign     Unable to Pay for Housing in the Last Year: No     Homeless in the Last Year: No        Vital Signs   Vitals:    09/10/24 0702 09/10/24 0704 09/10/24 0842 09/10/24 0943   BP: 128/73  123/71 135/79   BP Location: Left arm  Left arm    Patient Position: Lying  Lying    Pulse: (!) 54 (!) 53 (!) 57 (!) 51   Resp: 16 19 20 20   Temp:       TempSrc:       SpO2: 97% 98% 100% 100%   Weight:       Height:            Review of Systems  Review of Systems   Constitutional:  Positive for malaise/fatigue. Negative for chills and fever.   HENT: Negative.     Eyes: Negative.    Respiratory:  Negative for cough, shortness of breath and wheezing.    Cardiovascular:  Negative for chest pain, palpitations, leg swelling and PND.   Gastrointestinal:  Positive for abdominal pain, nausea and vomiting. Negative for constipation and diarrhea.   Genitourinary:  Negative for frequency and urgency.   Musculoskeletal:  Negative for back pain and myalgias.   Skin: Negative.    Neurological:  Negative for dizziness, focal weakness, weakness and headaches.   Psychiatric/Behavioral: Negative.     All other systems reviewed and are negative.      Brief Physical Exam/Reassessment   Physical Exam  Vitals and nursing note reviewed.   Constitutional:       General: He is in acute distress.      Appearance: Normal appearance. He is ill-appearing. He is not toxic-appearing.   HENT:      Head: Normocephalic and atraumatic.      Nose: Nose normal.      Mouth/Throat:      Lips: Pink.      Mouth: Mucous membranes are moist.   Eyes:      Pupils: Pupils are equal, round, and reactive to light.   Neck:      Vascular: No JVD.       Trachea: Phonation normal.   Cardiovascular:      Rate and Rhythm: Normal rate and regular rhythm.      Heart sounds: Normal heart sounds.   Pulmonary:      Effort: Pulmonary effort is normal.      Breath sounds: Normal breath sounds.   Abdominal:      General: Abdomen is flat. Bowel sounds are normal.      Palpations: Abdomen is soft.      Tenderness: There is generalized abdominal tenderness. There is no guarding or rebound.   Musculoskeletal:         General: Normal range of motion.   Skin:     General: Skin is warm and dry.   Neurological:      Mental Status: He is oriented to person, place, and time and easily aroused.   Psychiatric:         Behavior: Behavior is cooperative.         Labs/Imaging   Labs Reviewed   URINALYSIS, REFLEX TO URINE CULTURE - Abnormal       Result Value    Specimen UA Urine, Clean Catch      Color, UA Yellow      Appearance, UA Clear      pH, UA 7.0      Specific Gravity, UA 1.030      Protein, UA Trace (*)     Glucose, UA Negative      Ketones, UA 3+ (*)     Bilirubin (UA) Negative      Occult Blood UA Negative      Nitrite, UA Negative      Urobilinogen, UA 4.0-6.0 (*)     Leukocytes, UA 1+ (*)     Narrative:     Specimen Source->Urine   DRUG SCREEN PANEL, URINE EMERGENCY - Abnormal    Benzodiazepines Negative      Methadone metabolites Negative      Cocaine (Metab.) Negative      Opiate Scrn, Ur Negative      Barbiturate Screen, Ur Negative      Amphetamine Screen, Ur Negative      THC Presumptive Positive (*)     Phencyclidine Negative      Creatinine, Urine 342.4      Toxicology Information SEE COMMENT      Narrative:     Specimen Source->Urine   CBC W/ AUTO DIFFERENTIAL - Abnormal    WBC 4.31      RBC 3.90 (*)     Hemoglobin 13.4 (*)     Hematocrit 39.5 (*)      (*)     MCH 34.4 (*)     MCHC 33.9      RDW 10.9 (*)     Platelets 228      MPV 10.2      Immature Granulocytes 0.5      Gran # (ANC) 2.5      Immature Grans (Abs) 0.02      Lymph # 1.4      Mono # 0.4      Eos #  0.0      Baso # 0.01      nRBC 0      Gran % 57.1      Lymph % 32.5      Mono % 9.7      Eosinophil % 0.0      Basophil % 0.2      Differential Method Automated     COMPREHENSIVE METABOLIC PANEL - Abnormal    Sodium 139      Potassium 4.4      Chloride 106      CO2 20 (*)     Glucose 97      BUN 11      Creatinine 1.2      Calcium 8.7      Total Protein 7.7      Albumin 4.1      Total Bilirubin 1.3 (*)     Alkaline Phosphatase 39 (*)     AST 33      ALT 18      eGFR >60      Anion Gap 13     URINALYSIS MICROSCOPIC - Abnormal    RBC, UA 2      WBC, UA 8 (*)     Squam Epithel, UA 0      Microscopic Comment SEE COMMENT      Narrative:     Specimen Source->Urine   LIPASE    Lipase 10        Imaging Results    None          I reviewed all labs, imaging, medication response.     Plan   Will upgrade to Hospital Medicine due to patient unable to tolerate p.o..  Discussed case with hospital medicine.  Will admit for further evaluation and treatment.  Awaiting bed assignment.      I have discussed this case with ED physician, Dr. Marquez

## 2024-09-11 LAB
ANION GAP SERPL CALC-SCNC: 8 MMOL/L (ref 8–16)
BASOPHILS # BLD AUTO: 0.01 K/UL (ref 0–0.2)
BASOPHILS NFR BLD: 0.2 % (ref 0–1.9)
BUN SERPL-MCNC: 7 MG/DL (ref 6–20)
CALCIUM SERPL-MCNC: 8.8 MG/DL (ref 8.7–10.5)
CHLORIDE SERPL-SCNC: 108 MMOL/L (ref 95–110)
CO2 SERPL-SCNC: 23 MMOL/L (ref 23–29)
CREAT SERPL-MCNC: 1.2 MG/DL (ref 0.5–1.4)
DIFFERENTIAL METHOD BLD: ABNORMAL
EOSINOPHIL # BLD AUTO: 0.1 K/UL (ref 0–0.5)
EOSINOPHIL NFR BLD: 1.2 % (ref 0–8)
ERYTHROCYTE [DISTWIDTH] IN BLOOD BY AUTOMATED COUNT: 10.7 % (ref 11.5–14.5)
EST. GFR  (NO RACE VARIABLE): >60 ML/MIN/1.73 M^2
GLUCOSE SERPL-MCNC: 96 MG/DL (ref 70–110)
HCT VFR BLD AUTO: 39.5 % (ref 40–54)
HGB BLD-MCNC: 13.4 G/DL (ref 14–18)
IMM GRANULOCYTES # BLD AUTO: 0.01 K/UL (ref 0–0.04)
IMM GRANULOCYTES NFR BLD AUTO: 0.2 % (ref 0–0.5)
LYMPHOCYTES # BLD AUTO: 2.2 K/UL (ref 1–4.8)
LYMPHOCYTES NFR BLD: 43.5 % (ref 18–48)
MAGNESIUM SERPL-MCNC: 2.1 MG/DL (ref 1.6–2.6)
MCH RBC QN AUTO: 34.4 PG (ref 27–31)
MCHC RBC AUTO-ENTMCNC: 33.9 G/DL (ref 32–36)
MCV RBC AUTO: 101 FL (ref 82–98)
MONOCYTES # BLD AUTO: 0.6 K/UL (ref 0.3–1)
MONOCYTES NFR BLD: 10.8 % (ref 4–15)
NEUTROPHILS # BLD AUTO: 2.2 K/UL (ref 1.8–7.7)
NEUTROPHILS NFR BLD: 44.1 % (ref 38–73)
NRBC BLD-RTO: 0 /100 WBC
PHOSPHATE SERPL-MCNC: 3.4 MG/DL (ref 2.7–4.5)
PLATELET # BLD AUTO: 213 K/UL (ref 150–450)
PMV BLD AUTO: 9.4 FL (ref 9.2–12.9)
POTASSIUM SERPL-SCNC: 3.7 MMOL/L (ref 3.5–5.1)
RBC # BLD AUTO: 3.9 M/UL (ref 4.6–6.2)
SODIUM SERPL-SCNC: 139 MMOL/L (ref 136–145)
WBC # BLD AUTO: 5.08 K/UL (ref 3.9–12.7)

## 2024-09-11 PROCEDURE — 85025 COMPLETE CBC W/AUTO DIFF WBC: CPT | Performed by: INTERNAL MEDICINE

## 2024-09-11 PROCEDURE — 36415 COLL VENOUS BLD VENIPUNCTURE: CPT | Performed by: INTERNAL MEDICINE

## 2024-09-11 PROCEDURE — 94761 N-INVAS EAR/PLS OXIMETRY MLT: CPT

## 2024-09-11 PROCEDURE — 25000003 PHARM REV CODE 250: Performed by: EMERGENCY MEDICINE

## 2024-09-11 PROCEDURE — 84100 ASSAY OF PHOSPHORUS: CPT | Performed by: INTERNAL MEDICINE

## 2024-09-11 PROCEDURE — 11000001 HC ACUTE MED/SURG PRIVATE ROOM

## 2024-09-11 PROCEDURE — 25000003 PHARM REV CODE 250: Performed by: INTERNAL MEDICINE

## 2024-09-11 PROCEDURE — 80048 BASIC METABOLIC PNL TOTAL CA: CPT | Performed by: INTERNAL MEDICINE

## 2024-09-11 PROCEDURE — 83735 ASSAY OF MAGNESIUM: CPT | Performed by: INTERNAL MEDICINE

## 2024-09-11 PROCEDURE — 63600175 PHARM REV CODE 636 W HCPCS: Performed by: INTERNAL MEDICINE

## 2024-09-11 RX ORDER — DICYCLOMINE HYDROCHLORIDE 10 MG/ML
20 INJECTION INTRAMUSCULAR
Status: DISCONTINUED | OUTPATIENT
Start: 2024-09-11 | End: 2024-09-14 | Stop reason: HOSPADM

## 2024-09-11 RX ORDER — SCOLOPAMINE TRANSDERMAL SYSTEM 1 MG/1
1 PATCH, EXTENDED RELEASE TRANSDERMAL
Status: DISCONTINUED | OUTPATIENT
Start: 2024-09-11 | End: 2024-09-14 | Stop reason: HOSPADM

## 2024-09-11 RX ORDER — SUCRALFATE 1 G/1
1 TABLET ORAL
Qty: 28 TABLET | Refills: 1 | Status: SHIPPED | OUTPATIENT
Start: 2024-09-11 | End: 2024-09-14

## 2024-09-11 RX ORDER — PROMETHAZINE HYDROCHLORIDE 12.5 MG/1
12.5 SUPPOSITORY RECTAL EVERY 6 HOURS PRN
Qty: 12 SUPPOSITORY | Refills: 1 | Status: SHIPPED | OUTPATIENT
Start: 2024-09-11 | End: 2024-09-14

## 2024-09-11 RX ORDER — PANTOPRAZOLE SODIUM 40 MG/1
40 TABLET, DELAYED RELEASE ORAL DAILY
Qty: 30 TABLET | Refills: 0 | Status: SHIPPED | OUTPATIENT
Start: 2024-09-11 | End: 2024-09-14

## 2024-09-11 RX ORDER — ONDANSETRON 8 MG/1
8 TABLET, ORALLY DISINTEGRATING ORAL EVERY 6 HOURS PRN
Qty: 30 TABLET | Refills: 1 | Status: SHIPPED | OUTPATIENT
Start: 2024-09-11 | End: 2024-09-14

## 2024-09-11 RX ORDER — DICYCLOMINE HYDROCHLORIDE 10 MG/1
20 CAPSULE ORAL
Status: DISCONTINUED | OUTPATIENT
Start: 2024-09-11 | End: 2024-09-11

## 2024-09-11 RX ORDER — METOCLOPRAMIDE 10 MG/1
10 TABLET ORAL
Qty: 28 TABLET | Refills: 1 | Status: SHIPPED | OUTPATIENT
Start: 2024-09-11 | End: 2024-09-14

## 2024-09-11 RX ORDER — PROMETHAZINE HYDROCHLORIDE 25 MG/1
25 SUPPOSITORY RECTAL ONCE AS NEEDED
Status: COMPLETED | OUTPATIENT
Start: 2024-09-11 | End: 2024-09-11

## 2024-09-11 RX ORDER — SUCRALFATE 1 G/10ML
1 SUSPENSION ORAL
Status: DISCONTINUED | OUTPATIENT
Start: 2024-09-11 | End: 2024-09-12

## 2024-09-11 RX ADMIN — PANTOPRAZOLE SODIUM 40 MG: 40 TABLET, DELAYED RELEASE ORAL at 09:09

## 2024-09-11 RX ADMIN — DICYCLOMINE HYDROCHLORIDE 20 MG: 10 INJECTION, SOLUTION INTRAMUSCULAR at 08:09

## 2024-09-11 RX ADMIN — ONDANSETRON 8 MG: 2 INJECTION INTRAMUSCULAR; INTRAVENOUS at 05:09

## 2024-09-11 RX ADMIN — SUCRALFATE 1 G: 1 SUSPENSION ORAL at 06:09

## 2024-09-11 RX ADMIN — SUCRALFATE 1 G: 1 SUSPENSION ORAL at 11:09

## 2024-09-11 RX ADMIN — ALUMINUM HYDROXIDE, MAGNESIUM HYDROXIDE, AND DIMETHICONE 30 ML: 200; 20; 200 SUSPENSION ORAL at 06:09

## 2024-09-11 RX ADMIN — SUCRALFATE 1 G: 1 SUSPENSION ORAL at 08:09

## 2024-09-11 RX ADMIN — DICYCLOMINE HYDROCHLORIDE 20 MG: 10 INJECTION, SOLUTION INTRAMUSCULAR at 11:09

## 2024-09-11 RX ADMIN — SODIUM CHLORIDE: 9 INJECTION, SOLUTION INTRAVENOUS at 03:09

## 2024-09-11 RX ADMIN — SCOPOLAMINE 1 PATCH: 1.5 PATCH, EXTENDED RELEASE TRANSDERMAL at 03:09

## 2024-09-11 RX ADMIN — PROMETHAZINE HYDROCHLORIDE 25 MG: 25 SUPPOSITORY RECTAL at 09:09

## 2024-09-11 RX ADMIN — ALUMINUM HYDROXIDE, MAGNESIUM HYDROXIDE, AND DIMETHICONE 30 ML: 200; 20; 200 SUSPENSION ORAL at 11:09

## 2024-09-11 RX ADMIN — PROMETHAZINE HYDROCHLORIDE 25 MG: 25 INJECTION INTRAMUSCULAR; INTRAVENOUS at 02:09

## 2024-09-11 RX ADMIN — SODIUM CHLORIDE: 9 INJECTION, SOLUTION INTRAVENOUS at 06:09

## 2024-09-11 RX ADMIN — ONDANSETRON 8 MG: 2 INJECTION INTRAMUSCULAR; INTRAVENOUS at 11:09

## 2024-09-11 RX ADMIN — DICYCLOMINE HYDROCHLORIDE 20 MG: 10 INJECTION, SOLUTION INTRAMUSCULAR at 05:09

## 2024-09-11 NOTE — ASSESSMENT & PLAN NOTE
- Schedule ondansetron 8mg IV q6hr; continue promethazine 25mg IV q6hr PRN, metoclopramide 10mg IV q6hr PRN.  - Continue IVFs with NS.  - Reports dark stool but without other evidence of acute GI bleed / blood losses. Mild decrease in Hgb likely due to hemoconcentration / IVFs - remained stable. No further episodes reported.  - Continue pantoprazole 40mg PO daily, dicyclomine 20mg IM AC/HS, sucralfate 1g PO AC/HS.

## 2024-09-11 NOTE — SUBJECTIVE & OBJECTIVE
Interval History: No acute events overnight. Reports improvement in symptoms but when attempting clears reported episode of clear emesis. No other concerns at this time.    Review of Systems   Constitutional:  Negative for chills and fever.   Respiratory:  Negative for cough and shortness of breath.    Cardiovascular:  Negative for chest pain and palpitations.   Gastrointestinal:  Positive for abdominal pain, nausea and vomiting.     Objective:     Vital Signs (Most Recent):  Temp: 99.1 °F (37.3 °C) (09/11/24 1111)  Pulse: 63 (09/11/24 1111)  Resp: 16 (09/11/24 1111)  BP: (!) 160/90 (09/11/24 1111)  SpO2: 99 % (09/11/24 1111) Vital Signs (24h Range):  Temp:  [97.2 °F (36.2 °C)-99.1 °F (37.3 °C)] 99.1 °F (37.3 °C)  Pulse:  [55-82] 63  Resp:  [14-20] 16  SpO2:  [98 %-100 %] 99 %  BP: (120-172)/(77-99) 160/90     Weight: 85.3 kg (188 lb)  Body mass index is 28.59 kg/m².    Intake/Output Summary (Last 24 hours) at 9/11/2024 1226  Last data filed at 9/11/2024 0556  Gross per 24 hour   Intake 3669.09 ml   Output 550 ml   Net 3119.09 ml         Physical Exam  Vitals and nursing note reviewed.   Constitutional:       General: He is not in acute distress.     Appearance: He is well-developed. He is not ill-appearing.   HENT:      Head: Normocephalic and atraumatic.   Eyes:      General:         Right eye: No discharge.         Left eye: No discharge.      Conjunctiva/sclera: Conjunctivae normal.   Cardiovascular:      Rate and Rhythm: Normal rate.      Pulses: Normal pulses.   Pulmonary:      Effort: Pulmonary effort is normal. No respiratory distress.   Abdominal:      General: There is no distension.      Palpations: Abdomen is soft.      Tenderness: There is abdominal tenderness (epigastric).   Musculoskeletal:         General: Normal range of motion.      Right lower leg: No edema.      Left lower leg: No edema.   Skin:     General: Skin is warm and dry.   Neurological:      Mental Status: He is alert and oriented to  person, place, and time.         Significant Labs:   CBC:  Recent Labs   Lab 09/09/24  0331 09/10/24  0211 09/11/24  0456   WBC 4.66 4.31 5.08   HGB 16.1 13.4* 13.4*   HCT 44.5 39.5* 39.5*    228 213   GRAN 53.5  2.5 57.1  2.5 44.1  2.2   LYMPH 35.0  1.6 32.5  1.4 43.5  2.2   MONO 10.9  0.5 9.7  0.4 10.8  0.6   EOS 0.0 0.0 0.1   BASO 0.02 0.01 0.01   BMP:  Recent Labs   Lab 09/09/24  0331 09/10/24  0211 09/11/24  0456    139 139   K 3.7 4.4 3.7    106 108   CO2 17* 20* 23   BUN 11 11 7   CREATININE 1.5* 1.2 1.2   * 97 96   CALCIUM 10.1 8.7 8.8   MG 2.0 2.0 2.1   PHOS 2.0* 2.1* 3.4       Significant Imaging: I have reviewed and interpreted all pertinent imaging results/findings within the past 24 hours.

## 2024-09-11 NOTE — PLAN OF CARE
Assessment completed with significant other Sofi via phone - lives with significant other - she will provide transportation home - independent in ADLs - email sent to [facility] to review/assist with applying with Medicaid if applicable     [facility] ([address])  Initial Discharge Assessment       Primary Care Provider: No, Primary Doctor    Admission Diagnosis: Dehydration [E86.0]  Epigastric abdominal pain [R10.13]  Cannabinoid hyperemesis syndrome [R11.2, F12.90]    Admission Date: 9/10/2024  Expected Discharge Date:     Transition of Care Barriers: Unisured    Payor: /     Extended Emergency Contact Information  Primary Emergency Contact: Sofi Murphy   [place]  Mobile Phone: [phone]  Relation: Significant other    Discharge Plan A: Home with family  Discharge Plan B: Home with family      [id number] - [place], [place] - [address] [facility] [facility] [address] [place] [address]  Phone: [phone] Fax: [phone]      Initial Assessment (most recent)       Adult Discharge Assessment - 09/11/24 1627          Discharge Assessment    Assessment Type Discharge Planning Assessment     Confirmed/corrected address, phone number and insurance Yes     Confirmed Demographics Correct on Facesheet     Source of Information family     Communicated RUSSEL with patient/caregiver Yes     People in Home significant other     Do you expect to return to your current living situation? Yes     Do you have help at home or someone to help you manage your care at home? Yes     Prior to hospitilization cognitive status: Alert/Oriented     Current cognitive status: Alert/Oriented     Walking or Climbing Stairs Difficulty no     Dressing/Bathing Difficulty no     Equipment Currently Used at Home none     Readmission within 30 days? Yes     Patient currently being followed by outpatient case management? No     Do you currently have service(s)  that help you manage your care at home? No     Do you take prescription medications? No     Do you have prescription coverage? No     Do you have any problems affording any of your prescribed medications? TBD     Who is going to help you get home at discharge? significant other     How do you get to doctors appointments? car, drives self     Are you on dialysis? No     Do you take coumadin? No     Discharge Plan A Home with family     Discharge Plan B Home with family     DME Needed Upon Discharge  none     Discharge Plan discussed with: Spouse/sig other     Transition of Care Barriers Unisured        Physical Activity    On average, how many days per week do you engage in moderate to strenuous exercise (like a brisk walk)? 4 days     On average, how many minutes do you engage in exercise at this level? 120 min        Financial Resource Strain    How hard is it for you to pay for the very basics like food, housing, medical care, and heating? Not hard at all        Housing Stability    In the last 12 months, was there a time when you were not able to pay the mortgage or rent on time? No     At any time in the past 12 months, were you homeless or living in a shelter (including now)? No        Transportation Needs    Has the lack of transportation kept you from medical appointments, meetings, work or from getting things needed for daily living? No        Food Insecurity    Within the past 12 months, you worried that your food would run out before you got the money to buy more. Never true     Within the past 12 months, the food you bought just didn't last and you didn't have money to get more. Never true        Stress    Do you feel stress - tense, restless, nervous, or anxious, or unable to sleep at night because your mind is troubled all the time - these days? Not at all        Social Isolation    How often do you feel lonely or isolated from those around you?  Never        Alcohol Use    Q1: How often do you have a  drink containing alcohol? 2-3 times a week     Q2: How many drinks containing alcohol do you have on a typical day when you are drinking? 1 or 2     Q3: How often do you have six or more drinks on one occasion? Never        Utilities    In the past 12 months has the electric, gas, oil, or water company threatened to shut off services in your home? No        Health Literacy    How often do you need to have someone help you when you read instructions, pamphlets, or other written material from your doctor or pharmacy? Never                     Readmission Assessment (most recent)       Readmission Assessment - 09/11/24 1629          Readmission    Was this a planned readmission? No     Why were you hospitalized in the last 30 days? vomiting     Why were you readmitted? Related to previous admission     When you left the hospital how did you feel? sick again in 30 mins     When you left the hospital where did you go? Home with Family     Did patient/caregiver refused recommended DC plan? No     Tell me about what happened between when you left the hospital and the day you returned. nausea returned     When did you start not feeling well? just after discharge     Did you try to manage your symptoms your self? Yes     What did you do? meds     Did you call anyone? No     Did you try to see or did see a doctor or nurse before you came? No     Did you have  a follow-up appointment on discharge? No

## 2024-09-11 NOTE — PLAN OF CARE
09/11/24 1629   Readmission   Was this a planned readmission? No   Why were you hospitalized in the last 30 days? vomiting   Why were you readmitted? Related to previous admission   When you left the hospital how did you feel? sick again in 30 mins   When you left the hospital where did you go? Home with Family   Did patient/caregiver refused recommended DC plan? No   Tell me about what happened between when you left the hospital and the day you returned. nausea returned   When did you start not feeling well? just after discharge   Did you try to manage your symptoms your self? Yes   What did you do? meds   Did you call anyone? No   Did you try to see or did see a doctor or nurse before you came? No   Did you have  a follow-up appointment on discharge? No

## 2024-09-11 NOTE — PLAN OF CARE
Problem: Adult Inpatient Plan of Care  Goal: Plan of Care Review  Outcome: Progressing  Goal: Patient-Specific Goal (Individualized)  Outcome: Progressing  Goal: Absence of Hospital-Acquired Illness or Injury  Outcome: Progressing  Goal: Optimal Comfort and Wellbeing  Outcome: Progressing  POC reviewed with pt and family at bedside. NS infusing at 125 ml/hr. Room air. Nauseous throughout shift. Safety checks performed.

## 2024-09-11 NOTE — PROGRESS NOTES
[address] Medicine  Progress Note    Patient Name: Elder Ramos  MRN: [id number]  Patient Class: IP- Inpatient   Admission Date: 9/10/2024  Length of Stay: 1 days  Attending Physician: MAYDA Zaragoza MD  Primary Care Provider: Jessica, Primary Doctor        Subjective:     Principal Problem:Cannabis hyperemesis syndrome concurrent with and due to cannabis dependence        HPI:  Mr. Ramos is a 34/M with no significant PMH who presented to [facility] 09/10 with an approximately 10 day history of intractable nausea and vomiting. He has been to the ED and placed in observation on several occasions: 09/02, 09/05, 09/06-09/07, and 09/09. He presented with persistent symptoms on 09/10 once again. During his prior evaluations he underwent KUB and CT Abd/Pelvis were performed without noted intra-abdominal abnormality. Reports PO intake makes symptoms worse and improves with taking hot baths. Was referred to GI during prior workups. He notes intermittent chills, diaphoresis, abdominal cramping, nausea, vomiting. He notes black stool x1 but no coffee-ground emesis or nayana blood in stool or emesis. ED evaluation was notable for persistent nausea and vomiting that failed to respond to PRN antiemetics. Hospital medicine was contacted for observation placement.    Overview/Hospital Course:  No notes on file    Interval History: No acute events overnight. Reports improvement in symptoms but when attempting clears reported episode of clear emesis. No other concerns at this time.    Review of Systems   Constitutional:  Negative for chills and fever.   Respiratory:  Negative for cough and shortness of breath.    Cardiovascular:  Negative for chest pain and palpitations.   Gastrointestinal:  Positive for abdominal pain, nausea and vomiting.     Objective:     Vital Signs (Most Recent):  Temp: 99.1 °F (37.3 °C) (09/11/24 1111)  Pulse: 63 (09/11/24 1111)  Resp: 16 (09/11/24 1111)  BP: (!) 160/90  (09/11/24 1111)  SpO2: 99 % (09/11/24 1111) Vital Signs (24h Range):  Temp:  [97.2 °F (36.2 °C)-99.1 °F (37.3 °C)] 99.1 °F (37.3 °C)  Pulse:  [55-82] 63  Resp:  [14-20] 16  SpO2:  [98 %-100 %] 99 %  BP: (120-172)/(77-99) 160/90     Weight: 85.3 kg (188 lb)  Body mass index is 28.59 kg/m².    Intake/Output Summary (Last 24 hours) at 9/11/2024 1226  Last data filed at 9/11/2024 0556  Gross per 24 hour   Intake 3669.09 ml   Output 550 ml   Net 3119.09 ml         Physical Exam  Vitals and nursing note reviewed.   Constitutional:       General: He is not in acute distress.     Appearance: He is well-developed. He is not ill-appearing.   HENT:      Head: Normocephalic and atraumatic.   Eyes:      General:         Right eye: No discharge.         Left eye: No discharge.      Conjunctiva/sclera: Conjunctivae normal.   Cardiovascular:      Rate and Rhythm: Normal rate.      Pulses: Normal pulses.   Pulmonary:      Effort: Pulmonary effort is normal. No respiratory distress.   Abdominal:      General: There is no distension.      Palpations: Abdomen is soft.      Tenderness: There is abdominal tenderness (epigastric).   Musculoskeletal:         General: Normal range of motion.      Right lower leg: No edema.      Left lower leg: No edema.   Skin:     General: Skin is warm and dry.   Neurological:      Mental Status: He is alert and oriented to person, place, and time.         Significant Labs:   CBC:  Recent Labs   Lab 09/09/24  0331 09/10/24  0211 09/11/24  0456   WBC 4.66 4.31 5.08   HGB 16.1 13.4* 13.4*   HCT 44.5 39.5* 39.5*    228 213   GRAN 53.5  2.5 57.1  2.5 44.1  2.2   LYMPH 35.0  1.6 32.5  1.4 43.5  2.2   MONO 10.9  0.5 9.7  0.4 10.8  0.6   EOS 0.0 0.0 0.1   BASO 0.02 0.01 0.01   BMP:  Recent Labs   Lab 09/09/24  0331 09/10/24  0211 09/11/24  0456    139 139   K 3.7 4.4 3.7    106 108   CO2 17* 20* 23   BUN 11 11 7   CREATININE 1.5* 1.2 1.2   * 97 96   CALCIUM 10.1 8.7 8.8   MG 2.0  2.0 2.1   PHOS 2.0* 2.1* 3.4       Significant Imaging: I have reviewed and interpreted all pertinent imaging results/findings within the past 24 hours.    Assessment/Plan:      * Cannabis hyperemesis syndrome concurrent with and due to cannabis dependence  - Schedule ondansetron 8mg IV q6hr; continue promethazine 25mg IV q6hr PRN, metoclopramide 10mg IV q6hr PRN.  - Continue IVFs with NS.  - Reports dark stool but without other evidence of acute GI bleed / blood losses. Mild decrease in Hgb likely due to hemoconcentration / IVFs - remained stable. No further episodes reported.  - Continue pantoprazole 40mg PO daily, dicyclomine 20mg IM AC/HS, sucralfate 1g PO AC/HS.    Hypophosphatemia  - Due to poor PO intake with hyperemesis.  - Replete PRN.      VTE Risk Mitigation (From admission, onward)           Ordered     Place sequential compression device  Until discontinued         09/10/24 0354     IP VTE LOW RISK PATIENT  Once         09/10/24 0354                    Discharge Planning   RUSSEL:      Code Status: Full Code   Is the patient medically ready for discharge?:     Reason for patient still in hospital (select all that apply): Treatment                     D Jamil Zaragoza MD  Department of Hospital Medicine   [facility] ([address])

## 2024-09-11 NOTE — PLAN OF CARE
POC reviewed with patient this shift.  A/O x4.  Respirations unlabored on RA.  Skin w/d.  Continent of b/b.  Ambulates to restroom without difficulty.  X1 episode of emesis this shift but none further noted s/p x1 dose Ativan.  IVF continue as ordered.  VSS.  See flowsheet for full assessment.  Able to verbalize wants/needs.  No s/s of distress.  Fall/safety precautions maintained.      Problem: Adult Inpatient Plan of Care  Goal: Plan of Care Review  Outcome: Progressing     Problem: Hyperemesis Gravidarum  Goal: Nausea and Vomiting Relief  Outcome: Progressing

## 2024-09-12 LAB
ANION GAP SERPL CALC-SCNC: 10 MMOL/L (ref 8–16)
BUN SERPL-MCNC: 9 MG/DL (ref 6–20)
CALCIUM SERPL-MCNC: 8.7 MG/DL (ref 8.7–10.5)
CHLORIDE SERPL-SCNC: 107 MMOL/L (ref 95–110)
CO2 SERPL-SCNC: 22 MMOL/L (ref 23–29)
CREAT SERPL-MCNC: 1.3 MG/DL (ref 0.5–1.4)
EST. GFR  (NO RACE VARIABLE): >60 ML/MIN/1.73 M^2
GLUCOSE SERPL-MCNC: 84 MG/DL (ref 70–110)
MAGNESIUM SERPL-MCNC: 2.5 MG/DL (ref 1.6–2.6)
PHOSPHATE SERPL-MCNC: 3.3 MG/DL (ref 2.7–4.5)
POTASSIUM SERPL-SCNC: 3.9 MMOL/L (ref 3.5–5.1)
SODIUM SERPL-SCNC: 139 MMOL/L (ref 136–145)

## 2024-09-12 PROCEDURE — 25000003 PHARM REV CODE 250: Performed by: EMERGENCY MEDICINE

## 2024-09-12 PROCEDURE — 84100 ASSAY OF PHOSPHORUS: CPT | Performed by: INTERNAL MEDICINE

## 2024-09-12 PROCEDURE — 83735 ASSAY OF MAGNESIUM: CPT | Performed by: INTERNAL MEDICINE

## 2024-09-12 PROCEDURE — 25000003 PHARM REV CODE 250: Performed by: INTERNAL MEDICINE

## 2024-09-12 PROCEDURE — 80048 BASIC METABOLIC PNL TOTAL CA: CPT | Performed by: INTERNAL MEDICINE

## 2024-09-12 PROCEDURE — 11000001 HC ACUTE MED/SURG PRIVATE ROOM

## 2024-09-12 PROCEDURE — 36415 COLL VENOUS BLD VENIPUNCTURE: CPT | Performed by: INTERNAL MEDICINE

## 2024-09-12 PROCEDURE — 63600175 PHARM REV CODE 636 W HCPCS: Performed by: INTERNAL MEDICINE

## 2024-09-12 RX ORDER — HALOPERIDOL 5 MG/ML
5 INJECTION INTRAMUSCULAR EVERY 6 HOURS PRN
Status: DISCONTINUED | OUTPATIENT
Start: 2024-09-12 | End: 2024-09-14 | Stop reason: HOSPADM

## 2024-09-12 RX ADMIN — DICYCLOMINE HYDROCHLORIDE 20 MG: 10 INJECTION, SOLUTION INTRAMUSCULAR at 11:09

## 2024-09-12 RX ADMIN — ONDANSETRON 8 MG: 2 INJECTION INTRAMUSCULAR; INTRAVENOUS at 05:09

## 2024-09-12 RX ADMIN — DICYCLOMINE HYDROCHLORIDE 20 MG: 10 INJECTION, SOLUTION INTRAMUSCULAR at 06:09

## 2024-09-12 RX ADMIN — ONDANSETRON 8 MG: 2 INJECTION INTRAMUSCULAR; INTRAVENOUS at 06:09

## 2024-09-12 RX ADMIN — PROMETHAZINE HYDROCHLORIDE 25 MG: 25 INJECTION INTRAMUSCULAR; INTRAVENOUS at 10:09

## 2024-09-12 RX ADMIN — HALOPERIDOL LACTATE 5 MG: 5 INJECTION, SOLUTION INTRAMUSCULAR at 03:09

## 2024-09-12 RX ADMIN — PROMETHAZINE HYDROCHLORIDE 25 MG: 25 INJECTION INTRAMUSCULAR; INTRAVENOUS at 01:09

## 2024-09-12 RX ADMIN — ONDANSETRON 8 MG: 2 INJECTION INTRAMUSCULAR; INTRAVENOUS at 12:09

## 2024-09-12 RX ADMIN — ONDANSETRON 8 MG: 2 INJECTION INTRAMUSCULAR; INTRAVENOUS at 11:09

## 2024-09-12 RX ADMIN — SODIUM CHLORIDE: 9 INJECTION, SOLUTION INTRAVENOUS at 09:09

## 2024-09-12 RX ADMIN — DICYCLOMINE HYDROCHLORIDE 20 MG: 10 INJECTION, SOLUTION INTRAMUSCULAR at 08:09

## 2024-09-12 RX ADMIN — DICYCLOMINE HYDROCHLORIDE 20 MG: 10 INJECTION, SOLUTION INTRAMUSCULAR at 05:09

## 2024-09-12 NOTE — ASSESSMENT & PLAN NOTE
- Continue scheduled ondansetron 8mg IV q6hr, promethazine 25mg IV q6hr PRN, metoclopramide 10mg IV q6hr PRN. Clear liquids for now; reports continued difficulty tolerating even the sucralfate PO. Hold further sucralfate.  - Continue IVFs with NS.  - Continue pantoprazole 40mg PO daily, dicyclomine 20mg IM AC/HS, scopolamine 1.3-1.5mg transdermal q72hr.  - Will ask GI for further evaluation / management recommendations given lack of progress.

## 2024-09-12 NOTE — PROGRESS NOTES
[address] Medicine  Progress Note    Patient Name: Elder Ramos  MRN: [id number]  Patient Class: IP- Inpatient   Admission Date: 9/10/2024  Length of Stay: 2 days  Attending Physician: MAYDA Zaragoza MD  Primary Care Provider: Jessica, Primary Doctor        Subjective:     Principal Problem:Cannabis hyperemesis syndrome concurrent with and due to cannabis dependence        HPI:  Mr. Ramos is a 34/M with no significant PMH who presented to [facility] 09/10 with an approximately 10 day history of intractable nausea and vomiting. He has been to the ED and placed in observation on several occasions: 09/02, 09/05, 09/06-09/07, and 09/09. He presented with persistent symptoms on 09/10 once again. During his prior evaluations he underwent KUB and CT Abd/Pelvis were performed without noted intra-abdominal abnormality. Reports PO intake makes symptoms worse and improves with taking hot baths. Was referred to GI during prior workups. He notes intermittent chills, diaphoresis, abdominal cramping, nausea, vomiting. He notes black stool x1 but no coffee-ground emesis or nayana blood in stool or emesis. ED evaluation was notable for persistent nausea and vomiting that failed to respond to PRN antiemetics. Hospital medicine was contacted for observation placement.    Overview/Hospital Course:  No notes on file    Interval History: No acute events overnight. Still with poor PO tolerance to anything. No other concerns at this time.    Review of Systems   Constitutional:  Negative for chills and fever.   Respiratory:  Negative for cough and shortness of breath.    Cardiovascular:  Negative for chest pain and palpitations.   Gastrointestinal:  Positive for abdominal pain, nausea and vomiting.     Objective:     Vital Signs (Most Recent):  Temp: 97.7 °F (36.5 °C) (09/12/24 1132)  Pulse: 61 (09/12/24 1132)  Resp: 18 (09/12/24 1132)  BP: (!) 148/88 (09/12/24 1132)  SpO2: 99 % (09/12/24 1132) Vital  Signs (24h Range):  Temp:  [97.5 °F (36.4 °C)-99.9 °F (37.7 °C)] 97.7 °F (36.5 °C)  Pulse:  [55-61] 61  Resp:  [16-19] 18  SpO2:  [93 %-99 %] 99 %  BP: (140-161)/(86-95) 148/88     Weight: 85.3 kg (188 lb)  Body mass index is 28.59 kg/m².    Intake/Output Summary (Last 24 hours) at 9/12/2024 1449  Last data filed at 9/12/2024 0450  Gross per 24 hour   Intake 140 ml   Output 0 ml   Net 140 ml         Physical Exam  Vitals and nursing note reviewed.   Constitutional:       General: He is not in acute distress.     Appearance: He is well-developed. He is not ill-appearing.   HENT:      Head: Normocephalic and atraumatic.   Eyes:      General:         Right eye: No discharge.         Left eye: No discharge.      Conjunctiva/sclera: Conjunctivae normal.   Cardiovascular:      Rate and Rhythm: Normal rate.      Pulses: Normal pulses.   Pulmonary:      Effort: Pulmonary effort is normal. No respiratory distress.   Abdominal:      General: There is no distension.      Palpations: Abdomen is soft.      Tenderness: There is abdominal tenderness (epigastric).   Musculoskeletal:         General: Normal range of motion.      Right lower leg: No edema.      Left lower leg: No edema.   Skin:     General: Skin is warm and dry.   Neurological:      Mental Status: He is alert and oriented to person, place, and time.         Significant Labs:   CBC:  Recent Labs   Lab 09/10/24  0211 09/11/24  0456   WBC 4.31 5.08   HGB 13.4* 13.4*   HCT 39.5* 39.5*    213   GRAN 57.1  2.5 44.1  2.2   LYMPH 32.5  1.4 43.5  2.2   MONO 9.7  0.4 10.8  0.6   EOS 0.0 0.1   BASO 0.01 0.01   BMP:  Recent Labs   Lab 09/10/24  0211 09/11/24  0456 09/12/24  0333    139 139   K 4.4 3.7 3.9    108 107   CO2 20* 23 22*   BUN 11 7 9   CREATININE 1.2 1.2 1.3   GLU 97 96 84   CALCIUM 8.7 8.8 8.7   MG 2.0 2.1 2.5   PHOS 2.1* 3.4 3.3       Significant Imaging: I have reviewed and interpreted all pertinent imaging results/findings within the past  24 hours.      Assessment/Plan:      * Cannabis hyperemesis syndrome concurrent with and due to cannabis dependence  - Continue scheduled ondansetron 8mg IV q6hr, promethazine 25mg IV q6hr PRN, metoclopramide 10mg IV q6hr PRN. Clear liquids for now; reports continued difficulty tolerating even the sucralfate PO. Hold further sucralfate.  - Continue IVFs with NS.  - Continue pantoprazole 40mg PO daily, dicyclomine 20mg IM AC/HS, scopolamine 1.3-1.5mg transdermal q72hr.  - Will ask GI for further evaluation / management recommendations given lack of progress.    Hypophosphatemia  - Due to poor PO intake with hyperemesis.  - Replete PRN.      VTE Risk Mitigation (From admission, onward)           Ordered     Place sequential compression device  Until discontinued         09/10/24 0354     IP VTE LOW RISK PATIENT  Once         09/10/24 0354                    Discharge Planning   RUSSEL: 9/13/2024     Code Status: Full Code   Is the patient medically ready for discharge?:     Reason for patient still in hospital (select all that apply): Treatment  Discharge Plan A: Home with family                  D Jamil Zaragoza MD  Department of Hospital Medicine   [facility] ([address])

## 2024-09-12 NOTE — PLAN OF CARE
Problem: Adult Inpatient Plan of Care  Goal: Plan of Care Review  Outcome: Progressing  Goal: Patient-Specific Goal (Individualized)  Outcome: Progressing  Goal: Absence of Hospital-Acquired Illness or Injury  Outcome: Progressing  Goal: Optimal Comfort and Wellbeing  Outcome: Progressing  Goal: Readiness for Transition of Care  Outcome: Progressing     Problem: Hyperemesis Gravidarum  Goal: Nausea and Vomiting Relief  Outcome: Progressing

## 2024-09-12 NOTE — CONSULTS
Gastroenterology Consult    9/12/2024 10:46 AM    Patient Name: Elder Ramos  MRN: [id number]  Admission Date: 9/10/2024  Hospital Length of Stay: 2 days  Code Status: Full Code   Primary Care Physician: No, Primary Doctor  Principal Problem:Cannabis hyperemesis syndrome concurrent with and due to cannabis dependence  Consulting Physician: Inpatient consult to Gastroenterology  Consult performed by: Marina Luu MD  Consult ordered by: MAYDA Zaragoza MD        Reason for consultation: [facility]    HPI:  Elder Ramos is a 34 y.o. male with a history of cannabis dependence who presented with N/V over the last 10 days.  He has had 4 visits in the last 10 days due to these symptoms.  He has not been able to eat.  He gets abdominal cramping and did have 1 dark stool.  No hematemesis or hematochezia.  He has had imaging which was unremarkable (see below).  Never been hospitalized prior to this month for these symptoms.  Hot baths improve symptoms.  He gets epigastric pain just prior to the vomiting.  No family history of GI issues although mom reports she has similar episodes that improve with a hot bath, but she has not yet seen a GI doctor for this.  Patient has never had an EGD.  No NSAID use.  No gallstones seen on CT.      History reviewed. No pertinent past medical history.    History reviewed. No pertinent surgical history.    Social History     Tobacco Use    Smoking status: Never    Smokeless tobacco: Never   Substance Use Topics    Alcohol use: Yes     Comment: Social    Drug use: Yes     Types: Marijuana     Comment: Smokes daily for many years        Family History   Problem Relation Name Age of Onset    Diabetes Mother           Review of patient's allergies indicates:  No Known Allergies      Current Facility-Administered Medications:     0.9%  NaCl infusion, , Intravenous, Continuous, Ernestine Dotson MD, Last Rate: 125 mL/hr at 09/11/24 1514, New Bag at 09/11/24 1514    acetaminophen tablet 650 mg,  "650 mg, Oral, Q4H PRN, MAYDA Zaragoza MD    aluminum-magnesium hydroxide-simethicone 200-200-20 mg/5 mL suspension 30 mL, 30 mL, Oral, QID (AC & HS), Ernestine Dotson MD, 30 mL at 09/11/24 1139    dicyclomine injection 20 mg, 20 mg, Intramuscular, QID (AC & HS), MAYDA Zaragoza MD, 20 mg at 09/12/24 0610    melatonin tablet 6 mg, 6 mg, Oral, Nightly PRN, Ernestine Dotson MD    metoclopramide HCl 5 mg/5 mL solution 10 mg, 10 mg, Oral, Q6H PRN, MAYDA Zaragoza MD    ondansetron injection 8 mg, 8 mg, Intravenous, Q6H, MAYDA Zaragoza MD, 8 mg at 09/12/24 0613    pantoprazole EC tablet 40 mg, 40 mg, Oral, Daily, Ernestine Dotson MD, 40 mg at 09/11/24 0928    promethazine (PHENERGAN) 25 mg in 0.9% NaCl 50 mL IVPB, 25 mg, Intravenous, Q6H PRN, MAYDA Zaragoza MD, Stopped at 09/12/24 1020    scopolamine 1.3-1.5 mg (1 mg over 3 days) 1 patch, 1 patch, Transdermal, Q3 Days, MAYDA Zaragoza MD, 1 patch at 09/11/24 1513    sodium chloride 0.9% flush 10 mL, 10 mL, Intravenous, PRN, MAYDA Zaragoza MD    Review of Systems   Gastrointestinal:  Positive for abdominal pain, nausea and vomiting.   All other systems reviewed and are negative.      BP (!) 140/86 (BP Location: Left arm, Patient Position: Lying)   Pulse (!) 55   Temp 98.1 °F (36.7 °C) (Oral)   Resp 18   Ht 5' 8" (1.727 m)   Wt 85.3 kg (188 lb)   SpO2 99%   BMI 28.59 kg/m²   Physical Exam  Vitals reviewed.   Constitutional:       General: He is not in acute distress.     Appearance: He is well-developed.   HENT:      Head: Normocephalic and atraumatic.      Right Ear: External ear normal.      Left Ear: External ear normal.      Mouth/Throat:      Mouth: Mucous membranes are moist.      Pharynx: Oropharynx is clear.   Eyes:      General: No scleral icterus.     Pupils: Pupils are equal, round, and reactive to light.   Cardiovascular:      Rate and Rhythm: Normal rate and regular rhythm.      Heart sounds: Normal heart sounds. No murmur heard.  Pulmonary: "      Effort: Pulmonary effort is normal. No respiratory distress.      Breath sounds: Normal breath sounds.   Abdominal:      General: Bowel sounds are normal. There is no distension.      Palpations: Abdomen is soft.      Tenderness: There is no abdominal tenderness. There is no guarding or rebound.   Musculoskeletal:         General: Normal range of motion.   Skin:     General: Skin is warm and dry.      Findings: No rash.      Comments: tattoos   Neurological:      Mental Status: He is alert and oriented to person, place, and time.      Comments: Drowsy, falls asleep while I'm taking   Psychiatric:         Mood and Affect: Mood normal.         Thought Content: Thought content normal.         Labs:  Lab Results   Component Value Date/Time    WBC 5.08 09/11/2024 04:56 AM    HGB 13.4 (L) 09/11/2024 04:56 AM    HCT 39.5 (L) 09/11/2024 04:56 AM     09/11/2024 04:56 AM     (H) 09/11/2024 04:56 AM     09/12/2024 03:33 AM    K 3.9 09/12/2024 03:33 AM     09/12/2024 03:33 AM    CO2 22 (L) 09/12/2024 03:33 AM    BUN 9 09/12/2024 03:33 AM    CREATININE 1.3 09/12/2024 03:33 AM    GLU 84 09/12/2024 03:33 AM    CALCIUM 8.7 09/12/2024 03:33 AM    MG 2.5 09/12/2024 03:33 AM    PHOS 3.3 09/12/2024 03:33 AM   ]  Lab Results   Component Value Date/Time    PROT 7.7 09/10/2024 02:11 AM    ALBUMIN 4.1 09/10/2024 02:11 AM    BILITOT 1.3 (H) 09/10/2024 02:11 AM    AST 33 09/10/2024 02:11 AM    ALT 18 09/10/2024 02:11 AM    ALKPHOS 39 (L) 09/10/2024 02:11 AM   ]    Imaging and Procedures:  I personally reviewed the imaging/procedures below.  AXR 9/6/24:  There is a focal dilated loop of small bowel within the left upper quadrant. Potential partial developing obstruction to be considered. No additional abnormal small bowel dilatation seen. Scattered stool is seen throughout the colon. No free air. Visualized lung bases are clear.     CT A/P 9/7/24:  1. No acute abdominopelvic abnormality.  2. Additional findings  as above.    Assessment:  Elder Ramos is a 34 y.o. male with a history of cannabis dependence admitted with cannabis hyperemesis with ongoing symptoms for the last 10 days.     Plan:  - IV fluids, anti-emetics (zofran, phenergan, reglan) as ordered  - consider PRN haldol if above not working and if this fails, then ativan 1 mg  - dicyclomine for pain  - counseled on cannabis cessation  - consider TCA to help reduce frequency/severity of episodes long term  - EGD tomorrow to rule out PUD  - RUQ U/S to assess for gallstones    Marina Luu MD

## 2024-09-12 NOTE — SUBJECTIVE & OBJECTIVE
Interval History: No acute events overnight. Still with poor PO tolerance to anything. No other concerns at this time.    Review of Systems   Constitutional:  Negative for chills and fever.   Respiratory:  Negative for cough and shortness of breath.    Cardiovascular:  Negative for chest pain and palpitations.   Gastrointestinal:  Positive for abdominal pain, nausea and vomiting.     Objective:     Vital Signs (Most Recent):  Temp: 97.7 °F (36.5 °C) (09/12/24 1132)  Pulse: 61 (09/12/24 1132)  Resp: 18 (09/12/24 1132)  BP: (!) 148/88 (09/12/24 1132)  SpO2: 99 % (09/12/24 1132) Vital Signs (24h Range):  Temp:  [97.5 °F (36.4 °C)-99.9 °F (37.7 °C)] 97.7 °F (36.5 °C)  Pulse:  [55-61] 61  Resp:  [16-19] 18  SpO2:  [93 %-99 %] 99 %  BP: (140-161)/(86-95) 148/88     Weight: 85.3 kg (188 lb)  Body mass index is 28.59 kg/m².    Intake/Output Summary (Last 24 hours) at 9/12/2024 1449  Last data filed at 9/12/2024 0450  Gross per 24 hour   Intake 140 ml   Output 0 ml   Net 140 ml         Physical Exam  Vitals and nursing note reviewed.   Constitutional:       General: He is not in acute distress.     Appearance: He is well-developed. He is not ill-appearing.   HENT:      Head: Normocephalic and atraumatic.   Eyes:      General:         Right eye: No discharge.         Left eye: No discharge.      Conjunctiva/sclera: Conjunctivae normal.   Cardiovascular:      Rate and Rhythm: Normal rate.      Pulses: Normal pulses.   Pulmonary:      Effort: Pulmonary effort is normal. No respiratory distress.   Abdominal:      General: There is no distension.      Palpations: Abdomen is soft.      Tenderness: There is abdominal tenderness (epigastric).   Musculoskeletal:         General: Normal range of motion.      Right lower leg: No edema.      Left lower leg: No edema.   Skin:     General: Skin is warm and dry.   Neurological:      Mental Status: He is alert and oriented to person, place, and time.         Significant Labs:   CBC:  Recent  Labs   Lab 09/10/24  0211 09/11/24  0456   WBC 4.31 5.08   HGB 13.4* 13.4*   HCT 39.5* 39.5*    213   GRAN 57.1  2.5 44.1  2.2   LYMPH 32.5  1.4 43.5  2.2   MONO 9.7  0.4 10.8  0.6   EOS 0.0 0.1   BASO 0.01 0.01   BMP:  Recent Labs   Lab 09/10/24  0211 09/11/24  0456 09/12/24  0333    139 139   K 4.4 3.7 3.9    108 107   CO2 20* 23 22*   BUN 11 7 9   CREATININE 1.2 1.2 1.3   GLU 97 96 84   CALCIUM 8.7 8.8 8.7   MG 2.0 2.1 2.5   PHOS 2.1* 3.4 3.3       Significant Imaging: I have reviewed and interpreted all pertinent imaging results/findings within the past 24 hours.

## 2024-09-13 ENCOUNTER — ANESTHESIA EVENT (OUTPATIENT)
Dept: ENDOSCOPY | Facility: OTHER | Age: 34
End: 2024-09-13
Payer: MEDICAID

## 2024-09-13 ENCOUNTER — ANESTHESIA (OUTPATIENT)
Dept: ENDOSCOPY | Facility: OTHER | Age: 34
End: 2024-09-13
Payer: MEDICAID

## 2024-09-13 LAB
ANION GAP SERPL CALC-SCNC: 10 MMOL/L (ref 8–16)
BUN SERPL-MCNC: 10 MG/DL (ref 6–20)
CALCIUM SERPL-MCNC: 8.7 MG/DL (ref 8.7–10.5)
CHLORIDE SERPL-SCNC: 106 MMOL/L (ref 95–110)
CO2 SERPL-SCNC: 24 MMOL/L (ref 23–29)
CREAT SERPL-MCNC: 1.3 MG/DL (ref 0.5–1.4)
EST. GFR  (NO RACE VARIABLE): >60 ML/MIN/1.73 M^2
GLUCOSE SERPL-MCNC: 83 MG/DL (ref 70–110)
MAGNESIUM SERPL-MCNC: 2.2 MG/DL (ref 1.6–2.6)
OHS QRS DURATION: 88 MS
OHS QTC CALCULATION: 440 MS
PHOSPHATE SERPL-MCNC: 3.8 MG/DL (ref 2.7–4.5)
POTASSIUM SERPL-SCNC: 3.8 MMOL/L (ref 3.5–5.1)
SODIUM SERPL-SCNC: 140 MMOL/L (ref 136–145)

## 2024-09-13 PROCEDURE — 37000008 HC ANESTHESIA 1ST 15 MINUTES: Performed by: INTERNAL MEDICINE

## 2024-09-13 PROCEDURE — 63600175 PHARM REV CODE 636 W HCPCS: Performed by: INTERNAL MEDICINE

## 2024-09-13 PROCEDURE — 36415 COLL VENOUS BLD VENIPUNCTURE: CPT | Performed by: INTERNAL MEDICINE

## 2024-09-13 PROCEDURE — 11000001 HC ACUTE MED/SURG PRIVATE ROOM

## 2024-09-13 PROCEDURE — 63600175 PHARM REV CODE 636 W HCPCS: Performed by: NURSE ANESTHETIST, CERTIFIED REGISTERED

## 2024-09-13 PROCEDURE — 0DB68ZX EXCISION OF STOMACH, VIA NATURAL OR ARTIFICIAL OPENING ENDOSCOPIC, DIAGNOSTIC: ICD-10-PCS | Performed by: INTERNAL MEDICINE

## 2024-09-13 PROCEDURE — 88305 TISSUE EXAM BY PATHOLOGIST: CPT | Performed by: PATHOLOGY

## 2024-09-13 PROCEDURE — 84100 ASSAY OF PHOSPHORUS: CPT | Performed by: INTERNAL MEDICINE

## 2024-09-13 PROCEDURE — 25000003 PHARM REV CODE 250: Performed by: NURSE ANESTHETIST, CERTIFIED REGISTERED

## 2024-09-13 PROCEDURE — 37000009 HC ANESTHESIA EA ADD 15 MINS: Performed by: INTERNAL MEDICINE

## 2024-09-13 PROCEDURE — 80048 BASIC METABOLIC PNL TOTAL CA: CPT | Performed by: INTERNAL MEDICINE

## 2024-09-13 PROCEDURE — 83735 ASSAY OF MAGNESIUM: CPT | Performed by: INTERNAL MEDICINE

## 2024-09-13 PROCEDURE — 93005 ELECTROCARDIOGRAM TRACING: CPT

## 2024-09-13 PROCEDURE — 25000003 PHARM REV CODE 250: Performed by: EMERGENCY MEDICINE

## 2024-09-13 PROCEDURE — 43239 EGD BIOPSY SINGLE/MULTIPLE: CPT | Performed by: INTERNAL MEDICINE

## 2024-09-13 PROCEDURE — 27201012 HC FORCEPS, HOT/COLD, DISP: Performed by: INTERNAL MEDICINE

## 2024-09-13 PROCEDURE — 94761 N-INVAS EAR/PLS OXIMETRY MLT: CPT

## 2024-09-13 RX ORDER — GLUCAGON 1 MG
1 KIT INJECTION
Status: DISCONTINUED | OUTPATIENT
Start: 2024-09-13 | End: 2024-09-13

## 2024-09-13 RX ORDER — OXYCODONE HYDROCHLORIDE 5 MG/1
5 TABLET ORAL
Status: DISCONTINUED | OUTPATIENT
Start: 2024-09-13 | End: 2024-09-13

## 2024-09-13 RX ORDER — PROCHLORPERAZINE EDISYLATE 5 MG/ML
5 INJECTION INTRAMUSCULAR; INTRAVENOUS EVERY 30 MIN PRN
Status: DISCONTINUED | OUTPATIENT
Start: 2024-09-13 | End: 2024-09-13

## 2024-09-13 RX ORDER — SODIUM CHLORIDE 0.9 % (FLUSH) 0.9 %
3 SYRINGE (ML) INJECTION
Status: DISCONTINUED | OUTPATIENT
Start: 2024-09-13 | End: 2024-09-13

## 2024-09-13 RX ORDER — LIDOCAINE HYDROCHLORIDE 20 MG/ML
INJECTION INTRAVENOUS
Status: DISCONTINUED | OUTPATIENT
Start: 2024-09-13 | End: 2024-09-13

## 2024-09-13 RX ORDER — PROPOFOL 10 MG/ML
VIAL (ML) INTRAVENOUS
Status: DISCONTINUED | OUTPATIENT
Start: 2024-09-13 | End: 2024-09-13

## 2024-09-13 RX ORDER — HYDROMORPHONE HYDROCHLORIDE 2 MG/ML
0.4 INJECTION, SOLUTION INTRAMUSCULAR; INTRAVENOUS; SUBCUTANEOUS EVERY 5 MIN PRN
Status: DISCONTINUED | OUTPATIENT
Start: 2024-09-13 | End: 2024-09-13

## 2024-09-13 RX ORDER — MEPERIDINE HYDROCHLORIDE 25 MG/ML
12.5 INJECTION INTRAMUSCULAR; INTRAVENOUS; SUBCUTANEOUS ONCE AS NEEDED
Status: DISCONTINUED | OUTPATIENT
Start: 2024-09-13 | End: 2024-09-13

## 2024-09-13 RX ADMIN — SODIUM CHLORIDE: 9 INJECTION, SOLUTION INTRAVENOUS at 05:09

## 2024-09-13 RX ADMIN — PROPOFOL 150 MG: 10 INJECTION, EMULSION INTRAVENOUS at 08:09

## 2024-09-13 RX ADMIN — DICYCLOMINE HYDROCHLORIDE 20 MG: 10 INJECTION, SOLUTION INTRAMUSCULAR at 11:09

## 2024-09-13 RX ADMIN — ONDANSETRON 8 MG: 2 INJECTION INTRAMUSCULAR; INTRAVENOUS at 05:09

## 2024-09-13 RX ADMIN — ALUMINUM HYDROXIDE, MAGNESIUM HYDROXIDE, AND DIMETHICONE 30 ML: 200; 20; 200 SUSPENSION ORAL at 11:09

## 2024-09-13 RX ADMIN — DICYCLOMINE HYDROCHLORIDE 20 MG: 10 INJECTION, SOLUTION INTRAMUSCULAR at 08:09

## 2024-09-13 RX ADMIN — ALUMINUM HYDROXIDE, MAGNESIUM HYDROXIDE, AND DIMETHICONE 30 ML: 200; 20; 200 SUSPENSION ORAL at 08:09

## 2024-09-13 RX ADMIN — ONDANSETRON 8 MG: 2 INJECTION INTRAMUSCULAR; INTRAVENOUS at 11:09

## 2024-09-13 RX ADMIN — PROPOFOL 30 MG: 10 INJECTION, EMULSION INTRAVENOUS at 08:09

## 2024-09-13 RX ADMIN — DICYCLOMINE HYDROCHLORIDE 20 MG: 10 INJECTION, SOLUTION INTRAMUSCULAR at 04:09

## 2024-09-13 RX ADMIN — ALUMINUM HYDROXIDE, MAGNESIUM HYDROXIDE, AND DIMETHICONE 30 ML: 200; 20; 200 SUSPENSION ORAL at 04:09

## 2024-09-13 RX ADMIN — GLYCOPYRROLATE 0.2 MG: 0.2 INJECTION, SOLUTION INTRAMUSCULAR; INTRAVITREAL at 08:09

## 2024-09-13 RX ADMIN — PANTOPRAZOLE SODIUM 40 MG: 40 TABLET, DELAYED RELEASE ORAL at 11:09

## 2024-09-13 RX ADMIN — HALOPERIDOL LACTATE 5 MG: 5 INJECTION, SOLUTION INTRAMUSCULAR at 04:09

## 2024-09-13 RX ADMIN — LIDOCAINE HYDROCHLORIDE 100 MG: 20 INJECTION, SOLUTION INTRAVENOUS at 08:09

## 2024-09-13 NOTE — ANESTHESIA POSTPROCEDURE EVALUATION
Anesthesia Post Evaluation    Patient: Elder Ramos    Procedure(s) Performed: Procedure(s) (LRB):  EGD (ESOPHAGOGASTRODUODENOSCOPY) (N/A)    Final Anesthesia Type: general      Patient location during evaluation: PACU  Patient participation: Yes- Able to Participate  Level of consciousness: awake and alert  Post-procedure vital signs: reviewed and stable  Pain management: adequate  Airway patency: patent    PONV status at discharge: No PONV  Anesthetic complications: no      Cardiovascular status: blood pressure returned to baseline  Respiratory status: unassisted  Hydration status: euvolemic  Follow-up not needed.              Vitals Value Taken Time   /89 09/13/24 0930   Temp 36.6 °C (97.9 °F) 09/13/24 0930   Pulse 116 09/13/24 0950   Resp 22 09/13/24 0950   SpO2 100 % 09/13/24 0930   Vitals shown include unfiled device data.      Event Time   Out of Recovery 09/13/2024 09:59:00         Pain/Syed Score: Syed Score: 10 (9/13/2024  9:20 AM)

## 2024-09-13 NOTE — [employer] PATIENT INSTRUCTIONS
Discharge Summary/Instructions after an Endoscopic Procedure  Patient Name: Elder Ramos  Patient MRN: [id number]  Patient YOB: 1990 Friday, September 13, 2024  Rene Sanchez MD  RESTRICTIONS:  During your procedure today, you received medications for sedation.  These   medications may affect your judgment, balance and coordination.  Therefore,   for 24 hours, you have the following restrictions:   - DO NOT drive a car, operate machinery, make legal/financial decisions,   sign important papers or drink alcohol.    ACTIVITY:  Today: no heavy lifting, straining or running due to procedural   sedation/anesthesia.  The following day: return to full activity including work.  DIET:  Eat and drink normally unless instructed otherwise.     TREATMENT FOR COMMON SIDE EFFECTS:  - Mild abdominal pain, nausea, belching, bloating or excessive gas:  rest,   eat lightly and use a heating pad.  - Sore Throat: treat with throat lozenges and/or gargle with warm salt   water.  - Because air was used during the procedure, expelling large amounts of air   from your rectum or belching is normal.  - If a bowel prep was taken, you may not have a bowel movement for 1-3 days.    This is normal.  SYMPTOMS TO WATCH FOR AND REPORT TO YOUR PHYSICIAN:  1. Abdominal pain or bloating, other than gas cramps.  2. Chest pain.  3. Back pain.  4. Signs of infection such as: chills or fever occurring within 24 hours   after the procedure.  5. Rectal bleeding, which would show as bright red, maroon, or black stools.   (A tablespoon of blood from the rectum is not serious, especially if   hemorrhoids are present.)  6. Vomiting.  7. Weakness or dizziness.  GO DIRECTLY TO THE NEAREST EMERGENCY ROOM IF YOU HAVE ANY OF THE FOLLOWING:      Difficulty breathing              Chills and/or fever over 101 F   Persistent vomiting and/or vomiting blood   Severe abdominal pain   Severe chest pain   Black, tarry stools   Bleeding- more than one  tablespoon   Any other symptom or condition that you feel may need urgent attention  Your doctor recommends these additional instructions:  If any biopsies were taken, your doctors clinic will contact you in 1 to 2   weeks with any results.  - [facility] to [facility] for [facility] care.   - Advance diet as tolerated.   - Continue present medications.   - Await pathology results.   - Observe patient's clinical course.  For questions, problems or results please call your physician - Rene Sanchez MD at Work:  [phone].  [facility], EMERGENCY ROOM PHONE NUMBER: [phone], [facility]   [phone].  IF A COMPLICATION OR EMERGENCY SITUATION ARISES AND YOU ARE UNABLE TO REACH   YOUR PHYSICIAN - GO DIRECTLY TO THE EMERGENCY ROOM.  Rene Sanchez MD  9/13/2024 8:48:20 AM  This report has been verified and signed electronically.  Dear patient,  As a result of recent federal legislation (The Federal Cures Act), you may   receive lab or pathology results from your procedure in your [facility]   account before your physician is able to contact you. Your physician or   their representative will relay the results to you with their   recommendations at their soonest availability.  Thank you,  [employer]

## 2024-09-13 NOTE — OR NURSING
"Pt., tolerated recovery without any distress noted, AAOx4, VSS, Afebrile, Sats 100% on RA, pain reassessed, Pt stated "none", recovery care complete, report called & given to MALIKA Harrell/primary Nurse, Pt awaiting Pt's escort to return to room 356 via stretcher   "

## 2024-09-13 NOTE — PROGRESS NOTES
This gent had an uncomplicated upper endoscopy. Findings;  1) Normal esophagus  2) Mild antral gastritis, biopsy  3) Large volume of bilious material pooled in the stomach and removed via suction  4) Small duodenal ulcer.

## 2024-09-13 NOTE — TRANSFER OF CARE
"Anesthesia Transfer of Care Note    Patient: Elder Ramos    Procedure(s) Performed: Procedure(s) (LRB):  EGD (ESOPHAGOGASTRODUODENOSCOPY) (N/A)    Patient location: PACU    Anesthesia Type: general    Transport from OR: Transported from OR on 6-10 L/min O2 by face mask with adequate spontaneous ventilation    Post pain: adequate analgesia    Post assessment: no apparent anesthetic complications    Post vital signs: stable    Level of consciousness: awake    Nausea/Vomiting: no nausea/vomiting    Complications: none    Transfer of care protocol was followed      Last vitals: Visit Vitals  /81 (BP Location: Left arm, Patient Position: Lying)   Pulse (!) 59   Temp 36.9 °C (98.5 °F) (Oral)   Resp 18   Ht 5' 8" (1.727 m)   Wt 85.3 kg (188 lb)   SpO2 97%   BMI 28.59 kg/m²     "

## 2024-09-13 NOTE — OR NURSING
"Transporter Linda Jack acknowledged Pt pickup 30 minutes ago, I called the  to inquire if Linda was in route for the pt, the  stated, "let me call her",   [id number]- the transporter Linda just arrived to transfer the Pt back to room 356   "

## 2024-09-13 NOTE — ANESTHESIA PREPROCEDURE EVALUATION
09/13/2024  Elder Ramos is a 34 y.o., male.      Pre-op Assessment    I have reviewed the Patient Summary Reports.     I have reviewed the Nursing Notes. I have reviewed the NPO Status.   I have reviewed the Medications.     Review of Systems  Anesthesia Hx:  No problems with previous Anesthesia                Social:  Smoker       Hematology/Oncology:  Hematology Normal   Oncology Normal                                   EENT/Dental:  EENT/Dental Normal           Cardiovascular:  Exercise tolerance: good                                           Pulmonary:  Pulmonary Normal                       Hepatic/GI:  Hepatic/GI Normal                 Neurological:  Neurology Normal                                      Endocrine:  Endocrine Normal            Psych:  Psychiatric Normal                    Physical Exam  General: Well nourished, Alert and Cooperative    Airway:  Mallampati: II   Mouth Opening: Normal  TM Distance: Normal  Tongue: Normal  Neck ROM: Normal ROM    Dental:  Intact        Anesthesia Plan  Type of Anesthesia, risks & benefits discussed:    Anesthesia Type: MAC  Intra-op Monitoring Plan: Standard ASA Monitors  Post Op Pain Control Plan: multimodal analgesia  Induction:  IV  Informed Consent: Informed consent signed with the Patient and all parties understand the risks and agree with anesthesia plan.  All questions answered.   ASA Score: 2    Ready For Surgery From Anesthesia Perspective.     .

## 2024-09-13 NOTE — PROGRESS NOTES
[address] Medicine  Progress Note    Patient Name: Elder Ramos  MRN: [id number]  Patient Class: IP- Inpatient   Admission Date: 9/10/2024  Length of Stay: 3 days  Attending Physician: MAYDA Zaragoza MD  Primary Care Provider: Jessica, Primary Doctor        Subjective:     Principal Problem:Cannabis hyperemesis syndrome concurrent with and due to cannabis dependence        HPI:  Mr. Ramos is a 34/M with no significant PMH who presented to [facility] 09/10 with an approximately 10 day history of intractable nausea and vomiting. He has been to the ED and placed in observation on several occasions: 09/02, 09/05, 09/06-09/07, and 09/09. He presented with persistent symptoms on 09/10 once again. During his prior evaluations he underwent KUB and CT Abd/Pelvis were performed without noted intra-abdominal abnormality. Reports PO intake makes symptoms worse and improves with taking hot baths. Was referred to GI during prior workups. He notes intermittent chills, diaphoresis, abdominal cramping, nausea, vomiting. He notes black stool x1 but no coffee-ground emesis or nayana blood in stool or emesis. ED evaluation was notable for persistent nausea and vomiting that failed to respond to PRN antiemetics. Hospital medicine was contacted for observation placement.    Overview/Hospital Course:  No notes on file    Interval History: No acute events overnight. Seen after EGD, discussed with patient/family at bedside.    Review of Systems   Constitutional:  Negative for chills and fever.   Respiratory:  Negative for cough and shortness of breath.    Cardiovascular:  Negative for chest pain and palpitations.   Gastrointestinal:  Positive for abdominal pain and nausea. Negative for vomiting.     Objective:     Vital Signs (Most Recent):  Temp: 98.1 °F (36.7 °C) (09/13/24 1611)  Pulse: 60 (09/13/24 1611)  Resp: 18 (09/13/24 1611)  BP: (!) 141/84 (09/13/24 1611)  SpO2: 99 % (09/13/24 1611) Vital  Signs (24h Range):  Temp:  [97.9 °F (36.6 °C)-99 °F (37.2 °C)] 98.1 °F (36.7 °C)  Pulse:  [59-68] 60  Resp:  [17-18] 18  SpO2:  [96 %-100 %] 99 %  BP: (127-154)/(68-94) 141/84     Weight: 85.3 kg (188 lb)  Body mass index is 28.59 kg/m².    Intake/Output Summary (Last 24 hours) at 9/13/2024 1952  Last data filed at 9/13/2024 1721  Gross per 24 hour   Intake 1000 ml   Output --   Net 1000 ml         Physical Exam  Vitals and nursing note reviewed.   Constitutional:       General: He is not in acute distress.     Appearance: He is well-developed. He is not ill-appearing.   HENT:      Head: Normocephalic and atraumatic.   Eyes:      General:         Right eye: No discharge.         Left eye: No discharge.      Conjunctiva/sclera: Conjunctivae normal.   Cardiovascular:      Rate and Rhythm: Normal rate.      Pulses: Normal pulses.   Pulmonary:      Effort: Pulmonary effort is normal. No respiratory distress.   Abdominal:      General: There is no distension.      Palpations: Abdomen is soft.      Tenderness: There is no abdominal tenderness.   Musculoskeletal:         General: Normal range of motion.      Right lower leg: No edema.      Left lower leg: No edema.   Skin:     General: Skin is warm and dry.   Neurological:      Mental Status: He is alert and oriented to person, place, and time.         Significant Labs:   CBC:  Recent Labs   Lab 09/11/24  0456   WBC 5.08   HGB 13.4*   HCT 39.5*      GRAN 44.1  2.2   LYMPH 43.5  2.2   MONO 10.8  0.6   EOS 0.1   BASO 0.01   BMP:  Recent Labs   Lab 09/11/24  0456 09/12/24  0333 09/13/24  0443    139 140   K 3.7 3.9 3.8    107 106   CO2 23 22* 24   BUN 7 9 10   CREATININE 1.2 1.3 1.3   GLU 96 84 83   CALCIUM 8.8 8.7 8.7   MG 2.1 2.5 2.2   PHOS 3.4 3.3 3.8       Significant Imaging: I have reviewed and interpreted all pertinent imaging results/findings within the past 24 hours.      Assessment/Plan:      * Cannabis hyperemesis syndrome concurrent with and  due to cannabis dependence  - Continue scheduled ondansetron 8mg IV q6hr, promethazine 25mg IV q6hr PRN, metoclopramide 10mg IV q6hr PRN. Clear liquids for now; reports continued difficulty tolerating even the sucralfate PO. Holding further sucralfate.  - Continue IVFs with NS.  - Continue pantoprazole 40mg PO daily, dicyclomine 20mg IM AC/HS, scopolamine 1.3-1.5mg transdermal q72hr, metoclopramide, promethazine, haloperidol.  - Appreciate GI assistance. EGD 09/13 showing gastritis, duodenal ulcer without stigmata of bleeding.  - Gradually improving. Clears for now; advance if tolerating.    Hypophosphatemia  - Due to poor PO intake with hyperemesis.  - Replete PRN.      VTE Risk Mitigation (From admission, onward)           Ordered     Place sequential compression device  Until discontinued         09/10/24 0354     IP VTE LOW RISK PATIENT  Once         09/10/24 0354                    Discharge Planning   RUSSEL: 9/13/2024     Code Status: Full Code   Is the patient medically ready for discharge?:     Reason for patient still in hospital (select all that apply): Treatment  Discharge Plan A: Home with family                  D Jamil Zaragoza MD  Department of Hospital Medicine   [facility] ([address])

## 2024-09-14 VITALS
BODY MASS INDEX: 28.49 KG/M2 | SYSTOLIC BLOOD PRESSURE: 146 MMHG | OXYGEN SATURATION: 97 % | HEART RATE: 64 BPM | RESPIRATION RATE: 16 BRPM | DIASTOLIC BLOOD PRESSURE: 85 MMHG | HEIGHT: 68 IN | WEIGHT: 188 LBS | TEMPERATURE: 98 F

## 2024-09-14 PROBLEM — K26.9 DUODENAL ULCER: Status: ACTIVE | Noted: 2024-09-14

## 2024-09-14 LAB
ANION GAP SERPL CALC-SCNC: 11 MMOL/L (ref 8–16)
BUN SERPL-MCNC: 8 MG/DL (ref 6–20)
CALCIUM SERPL-MCNC: 8.7 MG/DL (ref 8.7–10.5)
CHLORIDE SERPL-SCNC: 105 MMOL/L (ref 95–110)
CO2 SERPL-SCNC: 23 MMOL/L (ref 23–29)
CREAT SERPL-MCNC: 1.2 MG/DL (ref 0.5–1.4)
EST. GFR  (NO RACE VARIABLE): >60 ML/MIN/1.73 M^2
GLUCOSE SERPL-MCNC: 89 MG/DL (ref 70–110)
MAGNESIUM SERPL-MCNC: 2.2 MG/DL (ref 1.6–2.6)
PHOSPHATE SERPL-MCNC: 3.3 MG/DL (ref 2.7–4.5)
POTASSIUM SERPL-SCNC: 3.6 MMOL/L (ref 3.5–5.1)
SODIUM SERPL-SCNC: 139 MMOL/L (ref 136–145)

## 2024-09-14 PROCEDURE — 25000003 PHARM REV CODE 250: Performed by: INTERNAL MEDICINE

## 2024-09-14 PROCEDURE — 83735 ASSAY OF MAGNESIUM: CPT | Performed by: INTERNAL MEDICINE

## 2024-09-14 PROCEDURE — 25000003 PHARM REV CODE 250: Performed by: PHYSICIAN ASSISTANT

## 2024-09-14 PROCEDURE — 36415 COLL VENOUS BLD VENIPUNCTURE: CPT | Performed by: INTERNAL MEDICINE

## 2024-09-14 PROCEDURE — 25000003 PHARM REV CODE 250: Performed by: EMERGENCY MEDICINE

## 2024-09-14 PROCEDURE — 63600175 PHARM REV CODE 636 W HCPCS: Performed by: INTERNAL MEDICINE

## 2024-09-14 PROCEDURE — 80048 BASIC METABOLIC PNL TOTAL CA: CPT | Performed by: INTERNAL MEDICINE

## 2024-09-14 PROCEDURE — 63600175 PHARM REV CODE 636 W HCPCS: Performed by: PHYSICIAN ASSISTANT

## 2024-09-14 PROCEDURE — 84100 ASSAY OF PHOSPHORUS: CPT | Performed by: INTERNAL MEDICINE

## 2024-09-14 RX ORDER — SUCRALFATE 1 G/1
1 TABLET ORAL
Qty: 28 TABLET | Refills: 1 | Status: SHIPPED | OUTPATIENT
Start: 2024-09-14 | End: 2024-09-14

## 2024-09-14 RX ORDER — SUCRALFATE 1 G/10ML
1 SUSPENSION ORAL EVERY 6 HOURS
Status: DISCONTINUED | OUTPATIENT
Start: 2024-09-14 | End: 2024-09-14

## 2024-09-14 RX ORDER — DICYCLOMINE HYDROCHLORIDE 10 MG/1
10 CAPSULE ORAL
Qty: 56 CAPSULE | Refills: 0 | Status: SHIPPED | OUTPATIENT
Start: 2024-09-14 | End: 2024-09-28

## 2024-09-14 RX ORDER — ONDANSETRON 8 MG/1
8 TABLET, ORALLY DISINTEGRATING ORAL EVERY 6 HOURS PRN
Qty: 30 TABLET | Refills: 1 | Status: SHIPPED | OUTPATIENT
Start: 2024-09-14 | End: 2024-09-14

## 2024-09-14 RX ORDER — ONDANSETRON 8 MG/1
8 TABLET, ORALLY DISINTEGRATING ORAL EVERY 6 HOURS PRN
Qty: 30 TABLET | Refills: 1 | Status: SHIPPED | OUTPATIENT
Start: 2024-09-14

## 2024-09-14 RX ORDER — PROMETHAZINE HYDROCHLORIDE 12.5 MG/1
12.5 SUPPOSITORY RECTAL EVERY 6 HOURS PRN
Qty: 12 SUPPOSITORY | Refills: 1 | Status: SHIPPED | OUTPATIENT
Start: 2024-09-14

## 2024-09-14 RX ORDER — SCOLOPAMINE TRANSDERMAL SYSTEM 1 MG/1
1 PATCH, EXTENDED RELEASE TRANSDERMAL
Qty: 4 PATCH | Refills: 0 | Status: SHIPPED | OUTPATIENT
Start: 2024-09-14 | End: 2024-09-14

## 2024-09-14 RX ORDER — DROPERIDOL 2.5 MG/ML
1.25 INJECTION, SOLUTION INTRAMUSCULAR; INTRAVENOUS ONCE
Status: COMPLETED | OUTPATIENT
Start: 2024-09-14 | End: 2024-09-14

## 2024-09-14 RX ORDER — DICYCLOMINE HYDROCHLORIDE 10 MG/1
10 CAPSULE ORAL
Qty: 56 CAPSULE | Refills: 0 | Status: SHIPPED | OUTPATIENT
Start: 2024-09-14 | End: 2024-09-14

## 2024-09-14 RX ORDER — METOCLOPRAMIDE 10 MG/1
10 TABLET ORAL
Qty: 28 TABLET | Refills: 1 | Status: SHIPPED | OUTPATIENT
Start: 2024-09-14 | End: 2024-09-14

## 2024-09-14 RX ORDER — PROMETHAZINE HYDROCHLORIDE 12.5 MG/1
12.5 SUPPOSITORY RECTAL EVERY 6 HOURS PRN
Qty: 12 SUPPOSITORY | Refills: 1 | Status: SHIPPED | OUTPATIENT
Start: 2024-09-14 | End: 2024-09-14

## 2024-09-14 RX ORDER — SCOLOPAMINE TRANSDERMAL SYSTEM 1 MG/1
1 PATCH, EXTENDED RELEASE TRANSDERMAL
Qty: 4 PATCH | Refills: 0 | Status: SHIPPED | OUTPATIENT
Start: 2024-09-14

## 2024-09-14 RX ORDER — SUCRALFATE 1 G/1
1 TABLET ORAL
Qty: 28 TABLET | Refills: 1 | Status: SHIPPED | OUTPATIENT
Start: 2024-09-14

## 2024-09-14 RX ORDER — AMOXICILLIN 250 MG
1 CAPSULE ORAL 2 TIMES DAILY
Status: DISCONTINUED | OUTPATIENT
Start: 2024-09-14 | End: 2024-09-14 | Stop reason: HOSPADM

## 2024-09-14 RX ORDER — PANTOPRAZOLE SODIUM 40 MG/1
40 TABLET, DELAYED RELEASE ORAL DAILY
Qty: 30 TABLET | Refills: 0 | Status: SHIPPED | OUTPATIENT
Start: 2024-09-14 | End: 2024-09-14

## 2024-09-14 RX ORDER — METOCLOPRAMIDE 10 MG/1
10 TABLET ORAL
Qty: 28 TABLET | Refills: 1 | Status: SHIPPED | OUTPATIENT
Start: 2024-09-14

## 2024-09-14 RX ORDER — PANTOPRAZOLE SODIUM 40 MG/1
40 TABLET, DELAYED RELEASE ORAL DAILY
Qty: 30 TABLET | Refills: 0 | Status: SHIPPED | OUTPATIENT
Start: 2024-09-14

## 2024-09-14 RX ADMIN — SUCRALFATE 1 G: 1 SUSPENSION ORAL at 05:09

## 2024-09-14 RX ADMIN — ONDANSETRON 8 MG: 2 INJECTION INTRAMUSCULAR; INTRAVENOUS at 05:09

## 2024-09-14 RX ADMIN — DICYCLOMINE HYDROCHLORIDE 20 MG: 10 INJECTION, SOLUTION INTRAMUSCULAR at 11:09

## 2024-09-14 RX ADMIN — DROPERIDOL 1.25 MG: 2.5 INJECTION, SOLUTION INTRAMUSCULAR; INTRAVENOUS at 03:09

## 2024-09-14 RX ADMIN — SODIUM CHLORIDE: 9 INJECTION, SOLUTION INTRAVENOUS at 03:09

## 2024-09-14 RX ADMIN — DICYCLOMINE HYDROCHLORIDE 20 MG: 10 INJECTION, SOLUTION INTRAMUSCULAR at 05:09

## 2024-09-14 RX ADMIN — ONDANSETRON 8 MG: 2 INJECTION INTRAMUSCULAR; INTRAVENOUS at 11:09

## 2024-09-14 RX ADMIN — ALUMINUM HYDROXIDE, MAGNESIUM HYDROXIDE, AND DIMETHICONE 30 ML: 200; 20; 200 SUSPENSION ORAL at 11:09

## 2024-09-14 RX ADMIN — PANTOPRAZOLE SODIUM 40 MG: 40 TABLET, DELAYED RELEASE ORAL at 08:09

## 2024-09-14 RX ADMIN — DOCUSATE SODIUM AND SENNOSIDES 1 TABLET: 8.6; 5 TABLET, FILM COATED ORAL at 08:09

## 2024-09-14 RX ADMIN — HALOPERIDOL LACTATE 5 MG: 5 INJECTION, SOLUTION INTRAMUSCULAR at 02:09

## 2024-09-14 RX ADMIN — ALUMINUM HYDROXIDE, MAGNESIUM HYDROXIDE, AND DIMETHICONE 30 ML: 200; 20; 200 SUSPENSION ORAL at 05:09

## 2024-09-14 NOTE — PLAN OF CARE
Pt follow up appointments were scheduled and added to AVS and waiting list if sooner appointments become available.  Pt was advised that if he receive a call or text for a sooner appointment he must follow up with the clinic or the earlier appointment will be given to someone else.  Family will transport pt to home when discharged.    09/14/24 1207   Final Note   Assessment Type Final Discharge Note   Anticipated Discharge Disposition Home   What phone number can be called within the next 1-3 days to see how you are doing after discharge? [phone]   Hospital Resources/Appts/Education Provided Provided patient/caregiver with written discharge plan information;Appointments scheduled and added to AVS   Post-Acute Status   Discharge Delays None known at this time     [facility] ([address])  Discharge Final Note    Primary Care Provider: No, Primary Doctor    Expected Discharge Date: 9/14/2024    Final Discharge Note (most recent)       Final Note - 09/14/24 1207          Final Note    Assessment Type Final Discharge Note (P)      Anticipated Discharge Disposition Home or Self Care (P)      What phone number can be called within the next 1-3 days to see how you are doing after discharge? [phone] (P)      Hospital Resources/Appts/Education Provided Provided patient/caregiver with written discharge plan information;Appointments scheduled and added to AVS (P)         Post-Acute Status    Discharge Delays None known at this time (P)                      Important Message from Medicare             Contact Info       [facility]   Specialty: Internal Medicine    [address]  [place] [address]   Phone: [phone]       Next Steps: Follow up in 2 week(s)    Instructions: post-hospital follow-up and to establish PCP    [facility]   Specialty: Gastroenterology    [address] [facility]  [address]  [facility] [address]   Phone:  [phone]       Next Steps: Follow up in 2 week(s)    Instructions: post-hospital follow-up

## 2024-09-14 NOTE — ASSESSMENT & PLAN NOTE
- Continue scheduled ondansetron 8mg IV q6hr, promethazine 25mg IV q6hr PRN, metoclopramide 10mg IV q6hr PRN. Clear liquids for now; reports continued difficulty tolerating even the sucralfate PO. Holding further sucralfate.  - Continue IVFs with NS.  - Continue pantoprazole 40mg PO daily, dicyclomine 20mg IM AC/HS, scopolamine 1.3-1.5mg transdermal q72hr, metoclopramide, promethazine, haloperidol.  - Appreciate GI assistance. EGD 09/13 showing gastritis, duodenal ulcer without stigmata of bleeding.  - Gradually improving. Clears for now; advance if tolerating.

## 2024-09-14 NOTE — SUBJECTIVE & OBJECTIVE
Interval History: No acute events overnight. Seen after EGD, discussed with patient/family at bedside.    Review of Systems   Constitutional:  Negative for chills and fever.   Respiratory:  Negative for cough and shortness of breath.    Cardiovascular:  Negative for chest pain and palpitations.   Gastrointestinal:  Positive for abdominal pain and nausea. Negative for vomiting.     Objective:     Vital Signs (Most Recent):  Temp: 98.1 °F (36.7 °C) (09/13/24 1611)  Pulse: 60 (09/13/24 1611)  Resp: 18 (09/13/24 1611)  BP: (!) 141/84 (09/13/24 1611)  SpO2: 99 % (09/13/24 1611) Vital Signs (24h Range):  Temp:  [97.9 °F (36.6 °C)-99 °F (37.2 °C)] 98.1 °F (36.7 °C)  Pulse:  [59-68] 60  Resp:  [17-18] 18  SpO2:  [96 %-100 %] 99 %  BP: (127-154)/(68-94) 141/84     Weight: 85.3 kg (188 lb)  Body mass index is 28.59 kg/m².    Intake/Output Summary (Last 24 hours) at 9/13/2024 1952  Last data filed at 9/13/2024 1721  Gross per 24 hour   Intake 1000 ml   Output --   Net 1000 ml         Physical Exam  Vitals and nursing note reviewed.   Constitutional:       General: He is not in acute distress.     Appearance: He is well-developed. He is not ill-appearing.   HENT:      Head: Normocephalic and atraumatic.   Eyes:      General:         Right eye: No discharge.         Left eye: No discharge.      Conjunctiva/sclera: Conjunctivae normal.   Cardiovascular:      Rate and Rhythm: Normal rate.      Pulses: Normal pulses.   Pulmonary:      Effort: Pulmonary effort is normal. No respiratory distress.   Abdominal:      General: There is no distension.      Palpations: Abdomen is soft.      Tenderness: There is no abdominal tenderness.   Musculoskeletal:         General: Normal range of motion.      Right lower leg: No edema.      Left lower leg: No edema.   Skin:     General: Skin is warm and dry.   Neurological:      Mental Status: He is alert and oriented to person, place, and time.         Significant Labs:   CBC:  Recent Labs   Lab  09/11/24  0456   WBC 5.08   HGB 13.4*   HCT 39.5*      GRAN 44.1  2.2   LYMPH 43.5  2.2   MONO 10.8  0.6   EOS 0.1   BASO 0.01   BMP:  Recent Labs   Lab 09/11/24  0456 09/12/24  0333 09/13/24  0443    139 140   K 3.7 3.9 3.8    107 106   CO2 23 22* 24   BUN 7 9 10   CREATININE 1.2 1.3 1.3   GLU 96 84 83   CALCIUM 8.8 8.7 8.7   MG 2.1 2.5 2.2   PHOS 3.4 3.3 3.8       Significant Imaging: I have reviewed and interpreted all pertinent imaging results/findings within the past 24 hours.

## 2024-09-16 LAB
FINAL PATHOLOGIC DIAGNOSIS: NORMAL
GROSS: NORMAL
Lab: NORMAL

## 2024-09-16 NOTE — DISCHARGE SUMMARY
[facility] Surg ([address] Medicine  Discharge Summary      Patient Name: Elder Ramos  MRN: [id number]  LISA: [id number]  Patient Class: IP- Inpatient  Admission Date: 9/10/2024  Hospital Length of Stay: 4 days  Discharge Date and Time: 9/14/2024  2:57 PM  Attending Physician: Jessica att. providers found   Discharging Provider: HUNTER Zaragoza MD  Primary Care Provider: Jessica, Primary Doctor    Primary Care Team: Networked reference to record PCT     HPI:   Mr. Ramos is a 34/M with no significant PMH who presented to [facility] 09/10 with an approximately 10 day history of intractable nausea and vomiting. He has been to the ED and placed in observation on several occasions: 09/02, 09/05, 09/06-09/07, and 09/09. He presented with persistent symptoms on 09/10 once again. During his prior evaluations he underwent KUB and CT Abd/Pelvis were performed without noted intra-abdominal abnormality. Reports PO intake makes symptoms worse and improves with taking hot baths. Was referred to GI during prior workups. He notes intermittent chills, diaphoresis, abdominal cramping, nausea, vomiting. He notes black stool x1 but no coffee-ground emesis or nayana blood in stool or emesis. ED evaluation was notable for persistent nausea and vomiting that failed to respond to PRN antiemetics. Hospital medicine was contacted for observation placement.    Procedure(s) (LRB):  EGD (ESOPHAGOGASTRODUODENOSCOPY) (N/A)      Hospital Course:   Admitted with intractable nausea/vomiting in setting of suspected cannabinoid hyperemesis. Started scheduled and PRN antiemetics, pantoprazole, sucralfate. Symptoms persistent and GI consulted. EGD performed 09/13 demonstrating gastritis and duodenal ulcer without evidence of recent bleed. Medications adjusted and gradually tolerated diet. With clinical improvement and vital stability, he was prepared for discharge home with pantoprazole, sucralfate, metoclopramide, dicyclomine,  ondansetron and outpatient GI follow-up.     Goals of Care Treatment Preferences:  Code Status: Full Code      SDOH Screening:  The patient was screened for utility difficulties, food insecurity, transport difficulties, housing insecurity, and interpersonal safety and there were no concerns identified this admission.     Consults:   Consults (From admission, onward)          Status Ordering Provider     Inpatient consult to Gastroenterology  Once        Provider:  Marina Luu MD Completed HEBERT, D. SCOTT            No new Assessment & Plan notes have been filed under this hospital service since the last note was generated.  Service: Hospital Medicine    Final Active Diagnoses:    Diagnosis Date Noted POA    PRINCIPAL PROBLEM:  Cannabis hyperemesis syndrome concurrent with and due to cannabis dependence [F12.288] 09/06/2024 Yes    Duodenal ulcer [K26.9] 09/14/2024 Yes    Hypophosphatemia [E83.39] 09/06/2024 Yes      Problems Resolved During this Admission:       Discharged Condition: good    Disposition: Home or Self Care    Follow Up:   Follow-up Information       [facility] Internal [facility] Follow up in 2 week(s).    Specialty: Internal Medicine  Why: post-hospital follow-up and to establish PCP  Contact information:  [address]  [facility] [id number] [phone]  Additional information:  Turn at Entrance [address] on [facility] in [facility] and take elevators to Floor 2. Follow signs to [facility]. Take [facility] Elevators to Floor 8 for [address].             [facility] Follow up in 2 week(s).    Specialty: Gastroenterology  Why: post-hospital follow-up  Contact information:  [address]  [address]  [facility] [address]  [phone]                           Patient Instructions:      Ambulatory referral/consult to Internal Medicine   Standing Status: Future   Referral Priority: Routine Referral Type: Consultation    Referral Reason: Specialty Services Required   Requested Specialty: Internal Medicine   Number of Visits Requested: 1     Ambulatory referral/consult to Gastroenterology   Standing Status: Future   Referral Priority: Routine Referral Type: Consultation   Referral Reason: Specialty Services Required   Requested Specialty: Gastroenterology   Number of Visits Requested: 1       Significant Diagnostic Studies:   CBC:  Recent Labs   Lab 09/09/24  0331 09/10/24  0211 09/11/24  0456   WBC 4.66 4.31 5.08   HGB 16.1 13.4* 13.4*   HCT 44.5 39.5* 39.5*    228 213   GRAN 53.5  2.5 57.1  2.5 44.1  2.2   LYMPH 35.0  1.6 32.5  1.4 43.5  2.2   MONO 10.9  0.5 9.7  0.4 10.8  0.6   EOS 0.0 0.0 0.1   BASO 0.02 0.01 0.01     BMP:  Recent Labs   Lab 09/12/24  0333 09/13/24  0443 09/14/24  0518    140 139   K 3.9 3.8 3.6    106 105   CO2 22* 24 23   BUN 9 10 8   CREATININE 1.3 1.3 1.2   GLU 84 83 89   CALCIUM 8.7 8.7 8.7   MG 2.5 2.2 2.2   PHOS 3.3 3.8 3.3       Pending Diagnostic Studies:       Procedure Component Value Units Date/Time    Specimen to Pathology, Surgery Gastrointestinal tract [[id number]] Collected: 09/13/24 0846    Order Status: Sent Lab Status: In process Updated: 09/13/24 1105    Specimen: Tissue            Medications:  Reconciled Home Medications:      Medication List        START taking these medications      ondansetron 8 MG Tbdl  Commonly known as: ZOFRAN-ODT  Take 1 tablet (8 mg total) by mouth every 6 (six) hours as needed (nausea / vomiting).     promethazine 12.5 MG Supp  Commonly known as: PHENERGAN  Place 1 suppository (12.5 mg total) rectally every 6 (six) hours as needed (nausea / vomiting).     scopolamine 1.3-1.5 mg (1 mg over 3 days)  Commonly known as: TRANSDERM-SCOP  Place 1 patch onto the skin Every 3 (three) days.     sucralfate 1 gram tablet  Commonly known as: CARAFATE  Take 1 tablet (1 g total) by mouth 4 (four) times daily before meals and nightly.            CHANGE  how you take these medications      metoclopramide HCl 10 MG tablet  Commonly known as: REGLAN  Take 1 tablet (10 mg total) by mouth 4 (four) times daily before meals and nightly.  What changed: when to take this            CONTINUE taking these medications      dicyclomine 10 MG capsule  Commonly known as: BENTYL  Take 1 capsule (10 mg total) by mouth 4 (four) times daily before meals and nightly. for 14 days     pantoprazole 40 MG tablet  Commonly known as: PROTONIX  Take 1 tablet (40 mg total) by mouth once daily.              Indwelling Lines/Drains at time of discharge:   Lines/Drains/Airways       None                   Time spent on the discharge of patient: 35 minutes         HUNTER Zaragoza MD  Department of Hospital Medicine  [facility] ([address])

## 2024-09-16 NOTE — HOSPITAL COURSE
Admitted with intractable nausea/vomiting in setting of suspected cannabinoid hyperemesis. Started scheduled and PRN antiemetics, pantoprazole, sucralfate. Symptoms persistent and GI consulted. EGD performed 09/13 demonstrating gastritis and duodenal ulcer without evidence of recent bleed. Medications adjusted and gradually tolerated diet. With clinical improvement and vital stability, he was prepared for discharge home with pantoprazole, sucralfate, metoclopramide, ondansetron and outpatient GI follow-up.

## 2024-11-01 ENCOUNTER — TELEPHONE (OUTPATIENT)
Dept: GASTROENTEROLOGY | Facility: CLINIC | Age: 34
End: 2024-11-01